# Patient Record
Sex: FEMALE | Race: WHITE | NOT HISPANIC OR LATINO | Employment: OTHER | URBAN - METROPOLITAN AREA
[De-identification: names, ages, dates, MRNs, and addresses within clinical notes are randomized per-mention and may not be internally consistent; named-entity substitution may affect disease eponyms.]

---

## 2017-08-23 ENCOUNTER — RECORDS - HEALTHEAST (OUTPATIENT)
Dept: LAB | Facility: CLINIC | Age: 77
End: 2017-08-23

## 2017-08-23 LAB
CHOLEST SERPL-MCNC: 148 MG/DL
FASTING STATUS PATIENT QL REPORTED: NO
HDLC SERPL-MCNC: 48 MG/DL
LDLC SERPL CALC-MCNC: 86 MG/DL
TRIGL SERPL-MCNC: 72 MG/DL

## 2018-09-28 ENCOUNTER — RECORDS - HEALTHEAST (OUTPATIENT)
Dept: LAB | Facility: CLINIC | Age: 78
End: 2018-09-28

## 2018-09-28 LAB
ANION GAP SERPL CALCULATED.3IONS-SCNC: 9 MMOL/L (ref 5–18)
BUN SERPL-MCNC: 13 MG/DL (ref 8–28)
CALCIUM SERPL-MCNC: 9.5 MG/DL (ref 8.5–10.5)
CHLORIDE BLD-SCNC: 106 MMOL/L (ref 98–107)
CHOLEST SERPL-MCNC: 210 MG/DL
CO2 SERPL-SCNC: 24 MMOL/L (ref 22–31)
CREAT SERPL-MCNC: 0.77 MG/DL (ref 0.6–1.1)
FASTING STATUS PATIENT QL REPORTED: ABNORMAL
GFR SERPL CREATININE-BSD FRML MDRD: >60 ML/MIN/1.73M2
GLUCOSE BLD-MCNC: 72 MG/DL (ref 70–125)
HDLC SERPL-MCNC: 52 MG/DL
LDLC SERPL CALC-MCNC: 142 MG/DL
POTASSIUM BLD-SCNC: 4.2 MMOL/L (ref 3.5–5)
SODIUM SERPL-SCNC: 139 MMOL/L (ref 136–145)
TRIGL SERPL-MCNC: 81 MG/DL

## 2018-10-29 ENCOUNTER — RECORDS - HEALTHEAST (OUTPATIENT)
Dept: LAB | Facility: CLINIC | Age: 78
End: 2018-10-29

## 2018-10-30 LAB — BACTERIA SPEC CULT: NO GROWTH

## 2019-01-16 ENCOUNTER — RECORDS - HEALTHEAST (OUTPATIENT)
Dept: LAB | Facility: CLINIC | Age: 79
End: 2019-01-16

## 2019-01-16 LAB
CK SERPL-CCNC: 214 U/L (ref 30–190)
ERYTHROCYTE [SEDIMENTATION RATE] IN BLOOD BY WESTERGREN METHOD: 2 MM/HR (ref 0–20)
TSH SERPL DL<=0.005 MIU/L-ACNC: 0.97 UIU/ML (ref 0.3–5)

## 2019-04-11 ENCOUNTER — RECORDS - HEALTHEAST (OUTPATIENT)
Dept: LAB | Facility: CLINIC | Age: 79
End: 2019-04-11

## 2019-04-11 LAB
CHOLEST SERPL-MCNC: 217 MG/DL
FASTING STATUS PATIENT QL REPORTED: ABNORMAL
HDLC SERPL-MCNC: 52 MG/DL
LDLC SERPL CALC-MCNC: 146 MG/DL
TRIGL SERPL-MCNC: 97 MG/DL

## 2020-02-06 ENCOUNTER — RECORDS - HEALTHEAST (OUTPATIENT)
Dept: LAB | Facility: CLINIC | Age: 80
End: 2020-02-06

## 2020-02-06 LAB
ALBUMIN SERPL-MCNC: 3.8 G/DL (ref 3.5–5)
ALP SERPL-CCNC: 73 U/L (ref 45–120)
ALT SERPL W P-5'-P-CCNC: 10 U/L (ref 0–45)
ANION GAP SERPL CALCULATED.3IONS-SCNC: 8 MMOL/L (ref 5–18)
AST SERPL W P-5'-P-CCNC: 16 U/L (ref 0–40)
BILIRUB SERPL-MCNC: 0.8 MG/DL (ref 0–1)
BUN SERPL-MCNC: 17 MG/DL (ref 8–28)
CALCIUM SERPL-MCNC: 9.5 MG/DL (ref 8.5–10.5)
CHLORIDE BLD-SCNC: 101 MMOL/L (ref 98–107)
CHOLEST SERPL-MCNC: 211 MG/DL
CK SERPL-CCNC: 137 U/L (ref 30–190)
CO2 SERPL-SCNC: 25 MMOL/L (ref 22–31)
CREAT SERPL-MCNC: 0.81 MG/DL (ref 0.6–1.1)
FASTING STATUS PATIENT QL REPORTED: ABNORMAL
GFR SERPL CREATININE-BSD FRML MDRD: >60 ML/MIN/1.73M2
GLUCOSE BLD-MCNC: 82 MG/DL (ref 70–125)
HDLC SERPL-MCNC: 54 MG/DL
LDLC SERPL CALC-MCNC: 140 MG/DL
POTASSIUM BLD-SCNC: 4.8 MMOL/L (ref 3.5–5)
PROT SERPL-MCNC: 6.1 G/DL (ref 6–8)
SODIUM SERPL-SCNC: 134 MMOL/L (ref 136–145)
TRIGL SERPL-MCNC: 86 MG/DL

## 2021-03-17 ENCOUNTER — RECORDS - HEALTHEAST (OUTPATIENT)
Dept: LAB | Facility: CLINIC | Age: 81
End: 2021-03-17

## 2021-03-17 LAB
ALBUMIN SERPL-MCNC: 3.9 G/DL (ref 3.5–5)
ALP SERPL-CCNC: 71 U/L (ref 45–120)
ALT SERPL W P-5'-P-CCNC: 15 U/L (ref 0–45)
ANION GAP SERPL CALCULATED.3IONS-SCNC: 9 MMOL/L (ref 5–18)
AST SERPL W P-5'-P-CCNC: 19 U/L (ref 0–40)
BILIRUB SERPL-MCNC: 0.8 MG/DL (ref 0–1)
BUN SERPL-MCNC: 13 MG/DL (ref 8–28)
CALCIUM SERPL-MCNC: 8.9 MG/DL (ref 8.5–10.5)
CHLORIDE BLD-SCNC: 100 MMOL/L (ref 98–107)
CHOLEST SERPL-MCNC: 205 MG/DL
CO2 SERPL-SCNC: 25 MMOL/L (ref 22–31)
CREAT SERPL-MCNC: 0.78 MG/DL (ref 0.6–1.1)
FASTING STATUS PATIENT QL REPORTED: ABNORMAL
GFR SERPL CREATININE-BSD FRML MDRD: >60 ML/MIN/1.73M2
GLUCOSE BLD-MCNC: 112 MG/DL (ref 70–125)
HDLC SERPL-MCNC: 53 MG/DL
LDLC SERPL CALC-MCNC: 134 MG/DL
POTASSIUM BLD-SCNC: 4.1 MMOL/L (ref 3.5–5)
PROT SERPL-MCNC: 6.1 G/DL (ref 6–8)
SODIUM SERPL-SCNC: 134 MMOL/L (ref 136–145)
TRIGL SERPL-MCNC: 91 MG/DL

## 2021-05-14 ENCOUNTER — RECORDS - HEALTHEAST (OUTPATIENT)
Dept: ADMINISTRATIVE | Facility: OTHER | Age: 81
End: 2021-05-14

## 2021-05-14 ENCOUNTER — ANESTHESIA - HEALTHEAST (OUTPATIENT)
Dept: SURGERY | Facility: HOSPITAL | Age: 81
End: 2021-05-14

## 2021-05-14 ASSESSMENT — MIFFLIN-ST. JEOR: SCORE: 1285.96

## 2021-05-15 ENCOUNTER — COMMUNICATION - HEALTHEAST (OUTPATIENT)
Dept: SCHEDULING | Facility: CLINIC | Age: 81
End: 2021-05-15

## 2021-05-15 ENCOUNTER — SURGERY - HEALTHEAST (OUTPATIENT)
Dept: SURGERY | Facility: HOSPITAL | Age: 81
End: 2021-05-15

## 2021-05-25 ENCOUNTER — RECORDS - HEALTHEAST (OUTPATIENT)
Dept: ADMINISTRATIVE | Facility: CLINIC | Age: 81
End: 2021-05-25

## 2021-05-27 ENCOUNTER — RECORDS - HEALTHEAST (OUTPATIENT)
Dept: ADMINISTRATIVE | Facility: CLINIC | Age: 81
End: 2021-05-27

## 2021-05-27 VITALS — HEIGHT: 69 IN | BODY MASS INDEX: 24.96 KG/M2 | WEIGHT: 169 LBS

## 2021-05-30 ENCOUNTER — RECORDS - HEALTHEAST (OUTPATIENT)
Dept: ADMINISTRATIVE | Facility: CLINIC | Age: 81
End: 2021-05-30

## 2021-06-01 ENCOUNTER — RECORDS - HEALTHEAST (OUTPATIENT)
Dept: ADMINISTRATIVE | Facility: OTHER | Age: 81
End: 2021-06-01

## 2021-06-02 ENCOUNTER — RECORDS - HEALTHEAST (OUTPATIENT)
Dept: ADMINISTRATIVE | Facility: CLINIC | Age: 81
End: 2021-06-02

## 2021-06-16 PROBLEM — E53.8 VITAMIN B12 DEFICIENCY (NON ANEMIC): Status: ACTIVE | Noted: 2021-05-14

## 2021-06-16 PROBLEM — S72.002A HIP FRACTURE, LEFT, CLOSED, INITIAL ENCOUNTER (H): Status: ACTIVE | Noted: 2021-05-14

## 2021-06-16 PROBLEM — I10 BENIGN ESSENTIAL HYPERTENSION: Status: ACTIVE | Noted: 2021-05-14

## 2021-06-16 PROBLEM — M54.30 SCIATICA: Status: ACTIVE | Noted: 2021-05-14

## 2021-06-16 PROBLEM — E78.2 MIXED HYPERLIPIDEMIA: Status: ACTIVE | Noted: 2021-05-14

## 2021-06-16 PROBLEM — S72.002A: Status: ACTIVE | Noted: 2021-05-14

## 2021-06-16 PROBLEM — E87.1 HYPONATREMIA: Status: ACTIVE | Noted: 2021-05-14

## 2021-06-17 NOTE — ANESTHESIA PREPROCEDURE EVALUATION
Anesthesia Evaluation      Patient summary reviewed     Airway   Mallampati: II   Pulmonary - normal exam                          Cardiovascular   (+) hypertension, , hypercholesterolemia,     Rhythm: regular        Neuro/Psych    (+) neuromuscular disease,      Endo/Other       Comments: Hip fracture, left, closed  Hyponatremia  B12 Deficiency    GI/Hepatic/Renal            Dental - normal exam                        Anesthesia Plan  Planned anesthetic: spinal and peripheral nerve block  FASCIA ILIACA FOR POSITIONING AND POST OP PAIN  ASA 2       Anesthesia plan special considerations: antiemetics,

## 2021-06-17 NOTE — ANESTHESIA PROCEDURE NOTES
Peripheral Block    Patient location during procedure: pre-op  Start time: 5/15/2021 9:39 AM  End time: 5/15/2021 9:43 AM  post-op analgesia per surgeon order as noted in medical record  Staffing:  Performing  Anesthesiologist: David German MD  Preanesthetic Checklist  Completed: patient identified, site marked, risks, benefits, and alternatives discussed, timeout performed, consent obtained, at patient's request, airway assessed, oxygen available, suction available, emergency drugs available and hand hygiene performed  Peripheral Block  Nerve block type: FASCIA ILIACA.  Prep: ChloraPrep  Patient position: supine  Patient monitoring: blood pressure, heart rate, continuous pulse oximetry and cardiac monitor  Laterality: left  Injection technique: ultrasound guided    Ultrasound used to visualize needle placement in proximity to nerve being blocked: yes   US used to visualize anesthetic spread  Visualized anatomic structures normal  No Pathological Findings  Permanent ultrasound image captured for medical record  Sterile gel and probe cover used for ultrasound.  Needle  Needle type: Stimuplex   Needle gauge: 21 G  Needle length: 4 in  no peripheral nerve catheter placed  Assessment  Injection assessment: no difficulty with injection, negative aspiration for heme, no paresthesia on injection and incremental injection

## 2021-06-17 NOTE — ANESTHESIA CARE TRANSFER NOTE
Last vitals:   Vitals:    05/15/21 1202   BP: 129/58   Pulse: 77   Resp: 17   Temp: 36.5  C (97.7  F)   SpO2: 97%     Patient's level of consciousness is drowsy  Spontaneous respirations: yes  Maintains airway independently: yes  Dentition unchanged: yes  Oropharynx: oropharynx clear of all foreign objects    QCDR Measures:  ASA# 20 - Surgical Safety Checklist: WHO surgical safety checklist completed prior to induction    PQRS# 430 - Adult PONV Prevention: 4558F - Pt received => 2 anti-emetic agents (different classes) preop & intraop  ASA# 8 - Peds PONV Prevention: NA - Not pediatric patient, not GA or 2 or more risk factors NOT present  PQRS# 424 - Richelle-op Temp Management: 4559F - At least one body temp DOCUMENTED => 35.5C or 95.9F within required timeframe  PQRS# 426 - PACU Transfer Protocol: - Transfer of care checklist used  ASA# 14 - Acute Post-op Pain: ASA14B - Patient did NOT experience pain >= 7 out of 10

## 2021-06-17 NOTE — ANESTHESIA POSTPROCEDURE EVALUATION
Patient: Alisson Gooden  Procedure(s):  HEMIARTHROPLASTY, HIP, BIPOLAR (Left)  Anesthesia type: spinal    Patient location: PACU  Last vitals:   Vitals Value Taken Time   /72 05/15/21 1315   Temp 36.4  C (97.5  F) 05/15/21 1245   Pulse 71 05/15/21 1328   Resp 15 05/15/21 1328   SpO2 100 % 05/15/21 1328   Vitals shown include unvalidated device data.  Post vital signs: stable  Level of consciousness: awake and responds to simple questions  Post-anesthesia pain: pain controlled  Post-anesthesia nausea and vomiting: no  Pulmonary: unassisted, return to baseline  Cardiovascular: stable and blood pressure at baseline  Hydration: adequate  Anesthetic events: no    QCDR Measures:  ASA# 11 - Richelle-op Cardiac Arrest: ASA11B - Patient did NOT experience unanticipated cardiac arrest  ASA# 12 - Richelle-op Mortality Rate: ASA12B - Patient did NOT die  ASA# 13 - PACU Re-Intubation Rate: NA - No ETT / LMA used for case  ASA# 10 - Composite Anes Safety: ASA10A - No serious adverse event    Additional Notes:

## 2021-06-17 NOTE — ANESTHESIA PROCEDURE NOTES
Spinal Block    Patient location during procedure: OR  Start time: 5/15/2021 10:23 AM  End time: 5/15/2021 10:26 AM  Reason for block: at surgeon's request, post-op pain management and primary anesthetic    Staffing:  Performing  Anesthesiologist: David German MD    Preanesthetic Checklist  Completed: patient identified, risks, benefits, and alternatives discussed, timeout performed, consent obtained, at patient's request, airway assessed, oxygen available, suction available, emergency drugs available and hand hygiene performed  Spinal Block  Patient position: sitting  Prep: ChloraPrep  Patient monitoring: heart rate, cardiac monitor, continuous pulse ox and blood pressure  Approach: midline  Location: L4-5  Injection technique: single-shot  Needle type: pencil-tip   Needle gauge: 24 G    Assessment  Sensory level: T8

## 2021-07-21 ENCOUNTER — RECORDS - HEALTHEAST (OUTPATIENT)
Dept: ADMINISTRATIVE | Facility: CLINIC | Age: 81
End: 2021-07-21

## 2021-08-15 ENCOUNTER — HEALTH MAINTENANCE LETTER (OUTPATIENT)
Age: 81
End: 2021-08-15

## 2021-10-11 ENCOUNTER — HEALTH MAINTENANCE LETTER (OUTPATIENT)
Age: 81
End: 2021-10-11

## 2022-01-12 VITALS — HEIGHT: 69 IN | WEIGHT: 169 LBS | BODY MASS INDEX: 25.03 KG/M2

## 2022-09-25 ENCOUNTER — HEALTH MAINTENANCE LETTER (OUTPATIENT)
Age: 82
End: 2022-09-25

## 2022-10-06 ENCOUNTER — TRANSFERRED RECORDS (OUTPATIENT)
Dept: HEALTH INFORMATION MANAGEMENT | Facility: CLINIC | Age: 82
End: 2022-10-06

## 2022-10-06 ENCOUNTER — LAB REQUISITION (OUTPATIENT)
Dept: LAB | Facility: CLINIC | Age: 82
End: 2022-10-06
Payer: MEDICARE

## 2022-10-06 DIAGNOSIS — I10 ESSENTIAL (PRIMARY) HYPERTENSION: ICD-10-CM

## 2022-10-06 DIAGNOSIS — E78.2 MIXED HYPERLIPIDEMIA: ICD-10-CM

## 2022-10-06 LAB
ALBUMIN SERPL BCG-MCNC: 4.2 G/DL (ref 3.5–5.2)
ALP SERPL-CCNC: 77 U/L (ref 35–104)
ALT SERPL W P-5'-P-CCNC: 11 U/L (ref 10–35)
ANION GAP SERPL CALCULATED.3IONS-SCNC: 9 MMOL/L (ref 7–15)
AST SERPL W P-5'-P-CCNC: 18 U/L (ref 10–35)
BILIRUB SERPL-MCNC: 0.5 MG/DL
BUN SERPL-MCNC: 19 MG/DL (ref 8–23)
CALCIUM SERPL-MCNC: 9.5 MG/DL (ref 8.8–10.2)
CHLORIDE SERPL-SCNC: 99 MMOL/L (ref 98–107)
CHOLEST SERPL-MCNC: 208 MG/DL
CREAT SERPL-MCNC: 0.86 MG/DL (ref 0.51–0.95)
DEPRECATED HCO3 PLAS-SCNC: 25 MMOL/L (ref 22–29)
GFR SERPL CREATININE-BSD FRML MDRD: 66 ML/MIN/1.73M2
GLUCOSE SERPL-MCNC: 102 MG/DL (ref 70–99)
HDLC SERPL-MCNC: 57 MG/DL
LDLC SERPL CALC-MCNC: 128 MG/DL
NONHDLC SERPL-MCNC: 151 MG/DL
POTASSIUM SERPL-SCNC: 5.1 MMOL/L (ref 3.4–5.3)
PROT SERPL-MCNC: 6 G/DL (ref 6.4–8.3)
SODIUM SERPL-SCNC: 133 MMOL/L (ref 136–145)
TRIGL SERPL-MCNC: 113 MG/DL

## 2022-10-06 PROCEDURE — 80061 LIPID PANEL: CPT | Mod: ORL | Performed by: FAMILY MEDICINE

## 2022-10-06 PROCEDURE — 80053 COMPREHEN METABOLIC PANEL: CPT | Mod: ORL | Performed by: FAMILY MEDICINE

## 2022-10-12 ENCOUNTER — TRANSFERRED RECORDS (OUTPATIENT)
Dept: HEALTH INFORMATION MANAGEMENT | Facility: CLINIC | Age: 82
End: 2022-10-12

## 2022-10-20 ENCOUNTER — MEDICAL CORRESPONDENCE (OUTPATIENT)
Dept: HEALTH INFORMATION MANAGEMENT | Facility: CLINIC | Age: 82
End: 2022-10-20

## 2022-10-24 ENCOUNTER — TRANSCRIBE ORDERS (OUTPATIENT)
Dept: OTHER | Age: 82
End: 2022-10-24

## 2022-10-24 DIAGNOSIS — M79.672 FOOT PAIN, LEFT: Primary | ICD-10-CM

## 2023-04-10 ENCOUNTER — LAB REQUISITION (OUTPATIENT)
Dept: LAB | Facility: CLINIC | Age: 83
End: 2023-04-10
Payer: MEDICARE

## 2023-04-10 DIAGNOSIS — R42 DIZZINESS AND GIDDINESS: ICD-10-CM

## 2023-04-10 LAB
ALBUMIN SERPL BCG-MCNC: 4 G/DL (ref 3.5–5.2)
ALP SERPL-CCNC: 90 U/L (ref 35–104)
ALT SERPL W P-5'-P-CCNC: 20 U/L (ref 10–35)
ANION GAP SERPL CALCULATED.3IONS-SCNC: 15 MMOL/L (ref 7–15)
AST SERPL W P-5'-P-CCNC: 29 U/L (ref 10–35)
BILIRUB SERPL-MCNC: 0.6 MG/DL
BUN SERPL-MCNC: 17 MG/DL (ref 8–23)
CALCIUM SERPL-MCNC: 9.2 MG/DL (ref 8.8–10.2)
CHLORIDE SERPL-SCNC: 90 MMOL/L (ref 98–107)
CREAT SERPL-MCNC: 0.97 MG/DL (ref 0.51–0.95)
DEPRECATED HCO3 PLAS-SCNC: 24 MMOL/L (ref 22–29)
GFR SERPL CREATININE-BSD FRML MDRD: 57 ML/MIN/1.73M2
GLUCOSE SERPL-MCNC: 98 MG/DL (ref 70–99)
POTASSIUM SERPL-SCNC: 4.2 MMOL/L (ref 3.4–5.3)
PROT SERPL-MCNC: 6 G/DL (ref 6.4–8.3)
SODIUM SERPL-SCNC: 129 MMOL/L (ref 136–145)

## 2023-04-10 PROCEDURE — 87088 URINE BACTERIA CULTURE: CPT | Mod: ORL | Performed by: PHYSICIAN ASSISTANT

## 2023-04-10 PROCEDURE — 80053 COMPREHEN METABOLIC PANEL: CPT | Mod: ORL | Performed by: PHYSICIAN ASSISTANT

## 2023-04-14 LAB
BACTERIA UR CULT: ABNORMAL
BACTERIA UR CULT: ABNORMAL

## 2023-04-27 ENCOUNTER — LAB REQUISITION (OUTPATIENT)
Dept: LAB | Facility: CLINIC | Age: 83
End: 2023-04-27
Payer: MEDICARE

## 2023-04-27 DIAGNOSIS — N39.0 URINARY TRACT INFECTION, SITE NOT SPECIFIED: ICD-10-CM

## 2023-04-27 LAB — FOLATE SERPL-MCNC: 11.8 NG/ML (ref 4.6–34.8)

## 2023-04-27 PROCEDURE — 84443 ASSAY THYROID STIM HORMONE: CPT | Mod: ORL | Performed by: PHYSICIAN ASSISTANT

## 2023-04-27 PROCEDURE — 87086 URINE CULTURE/COLONY COUNT: CPT | Mod: ORL | Performed by: PHYSICIAN ASSISTANT

## 2023-04-27 PROCEDURE — 82746 ASSAY OF FOLIC ACID SERUM: CPT | Mod: ORL | Performed by: PHYSICIAN ASSISTANT

## 2023-04-27 PROCEDURE — 82607 VITAMIN B-12: CPT | Mod: ORL | Performed by: PHYSICIAN ASSISTANT

## 2023-04-28 LAB
TSH SERPL DL<=0.005 MIU/L-ACNC: 1.1 UIU/ML (ref 0.3–4.2)
VIT B12 SERPL-MCNC: 472 PG/ML (ref 232–1245)

## 2023-04-29 LAB — BACTERIA UR CULT: NORMAL

## 2023-09-29 ENCOUNTER — LAB REQUISITION (OUTPATIENT)
Dept: LAB | Facility: CLINIC | Age: 83
End: 2023-09-29
Payer: MEDICARE

## 2023-09-29 DIAGNOSIS — R55 SYNCOPE AND COLLAPSE: ICD-10-CM

## 2023-09-29 DIAGNOSIS — R10.84 GENERALIZED ABDOMINAL PAIN: ICD-10-CM

## 2023-09-29 LAB
ALBUMIN SERPL BCG-MCNC: 4.1 G/DL (ref 3.5–5.2)
ALP SERPL-CCNC: 60 U/L (ref 35–104)
ALT SERPL W P-5'-P-CCNC: 10 U/L (ref 0–50)
ANION GAP SERPL CALCULATED.3IONS-SCNC: 11 MMOL/L (ref 7–15)
AST SERPL W P-5'-P-CCNC: 18 U/L (ref 0–45)
BILIRUB SERPL-MCNC: 0.8 MG/DL
BUN SERPL-MCNC: 11.9 MG/DL (ref 8–23)
CALCIUM SERPL-MCNC: 9.3 MG/DL (ref 8.8–10.2)
CHLORIDE SERPL-SCNC: 99 MMOL/L (ref 98–107)
CREAT SERPL-MCNC: 0.83 MG/DL (ref 0.51–0.95)
DEPRECATED HCO3 PLAS-SCNC: 24 MMOL/L (ref 22–29)
EGFRCR SERPLBLD CKD-EPI 2021: 69 ML/MIN/1.73M2
ERYTHROCYTE [SEDIMENTATION RATE] IN BLOOD BY WESTERGREN METHOD: 10 MM/HR (ref 0–30)
GLUCOSE SERPL-MCNC: 87 MG/DL (ref 70–99)
LIPASE SERPL-CCNC: 27 U/L (ref 13–60)
POTASSIUM SERPL-SCNC: 5 MMOL/L (ref 3.4–5.3)
PROT SERPL-MCNC: 6.2 G/DL (ref 6.4–8.3)
SODIUM SERPL-SCNC: 134 MMOL/L (ref 135–145)

## 2023-09-29 PROCEDURE — 85652 RBC SED RATE AUTOMATED: CPT | Mod: ORL | Performed by: FAMILY MEDICINE

## 2023-09-29 PROCEDURE — 83690 ASSAY OF LIPASE: CPT | Mod: ORL | Performed by: FAMILY MEDICINE

## 2023-09-29 PROCEDURE — 80053 COMPREHEN METABOLIC PANEL: CPT | Mod: ORL | Performed by: FAMILY MEDICINE

## 2023-09-29 PROCEDURE — 87086 URINE CULTURE/COLONY COUNT: CPT | Mod: ORL | Performed by: FAMILY MEDICINE

## 2023-10-01 LAB — BACTERIA UR CULT: NORMAL

## 2023-10-08 ENCOUNTER — APPOINTMENT (OUTPATIENT)
Dept: CT IMAGING | Facility: HOSPITAL | Age: 83
DRG: 312 | End: 2023-10-08
Attending: EMERGENCY MEDICINE
Payer: COMMERCIAL

## 2023-10-08 ENCOUNTER — HOSPITAL ENCOUNTER (INPATIENT)
Facility: HOSPITAL | Age: 83
LOS: 1 days | Discharge: HOME HEALTH | DRG: 312 | End: 2023-10-10
Attending: EMERGENCY MEDICINE | Admitting: INTERNAL MEDICINE
Payer: COMMERCIAL

## 2023-10-08 ENCOUNTER — TRANSFERRED RECORDS (OUTPATIENT)
Dept: HEALTH INFORMATION MANAGEMENT | Facility: CLINIC | Age: 83
End: 2023-10-08

## 2023-10-08 DIAGNOSIS — R55 SYNCOPE, UNSPECIFIED SYNCOPE TYPE: ICD-10-CM

## 2023-10-08 DIAGNOSIS — M25.561 PAIN AND SWELLING OF RIGHT KNEE: Primary | ICD-10-CM

## 2023-10-08 DIAGNOSIS — M25.461 PAIN AND SWELLING OF RIGHT KNEE: Primary | ICD-10-CM

## 2023-10-08 LAB
ALBUMIN SERPL BCG-MCNC: 3.8 G/DL (ref 3.5–5.2)
ALBUMIN UR-MCNC: 20 MG/DL
ALP SERPL-CCNC: 60 U/L (ref 35–104)
ALT SERPL W P-5'-P-CCNC: 9 U/L (ref 0–50)
AMORPH CRY #/AREA URNS HPF: ABNORMAL /HPF
ANION GAP SERPL CALCULATED.3IONS-SCNC: 10 MMOL/L (ref 7–15)
APPEARANCE UR: ABNORMAL
AST SERPL W P-5'-P-CCNC: 17 U/L (ref 0–45)
BASO+EOS+MONOS # BLD AUTO: NORMAL 10*3/UL
BASO+EOS+MONOS NFR BLD AUTO: NORMAL %
BASOPHILS # BLD AUTO: 0 10E3/UL (ref 0–0.2)
BASOPHILS NFR BLD AUTO: 0 %
BILIRUB DIRECT SERPL-MCNC: 0.22 MG/DL (ref 0–0.3)
BILIRUB SERPL-MCNC: 0.9 MG/DL
BILIRUB UR QL STRIP: NEGATIVE
BUN SERPL-MCNC: 13.5 MG/DL (ref 8–23)
CALCIUM SERPL-MCNC: 9.6 MG/DL (ref 8.8–10.2)
CHLORIDE SERPL-SCNC: 97 MMOL/L (ref 98–107)
COLOR UR AUTO: YELLOW
CREAT SERPL-MCNC: 0.75 MG/DL (ref 0.51–0.95)
DEPRECATED HCO3 PLAS-SCNC: 24 MMOL/L (ref 22–29)
EGFRCR SERPLBLD CKD-EPI 2021: 78 ML/MIN/1.73M2
EOSINOPHIL # BLD AUTO: 0 10E3/UL (ref 0–0.7)
EOSINOPHIL NFR BLD AUTO: 0 %
ERYTHROCYTE [DISTWIDTH] IN BLOOD BY AUTOMATED COUNT: 13.9 % (ref 10–15)
GLUCOSE SERPL-MCNC: 147 MG/DL (ref 70–99)
GLUCOSE UR STRIP-MCNC: NEGATIVE MG/DL
HCT VFR BLD AUTO: 39.9 % (ref 35–47)
HGB BLD-MCNC: 13.6 G/DL (ref 11.7–15.7)
HGB UR QL STRIP: NEGATIVE
HOLD SPECIMEN: NORMAL
HYALINE CASTS: 8 /LPF
IMM GRANULOCYTES # BLD: 0 10E3/UL
IMM GRANULOCYTES NFR BLD: 0 %
KETONES UR STRIP-MCNC: 20 MG/DL
LEUKOCYTE ESTERASE UR QL STRIP: NEGATIVE
LYMPHOCYTES # BLD AUTO: 2 10E3/UL (ref 0.8–5.3)
LYMPHOCYTES NFR BLD AUTO: 26 %
MCH RBC QN AUTO: 30.4 PG (ref 26.5–33)
MCHC RBC AUTO-ENTMCNC: 34.1 G/DL (ref 31.5–36.5)
MCV RBC AUTO: 89 FL (ref 78–100)
MONOCYTES # BLD AUTO: 0.4 10E3/UL (ref 0–1.3)
MONOCYTES NFR BLD AUTO: 6 %
MUCOUS THREADS #/AREA URNS LPF: PRESENT /LPF
NEUTROPHILS # BLD AUTO: 5.2 10E3/UL (ref 1.6–8.3)
NEUTROPHILS NFR BLD AUTO: 68 %
NITRATE UR QL: NEGATIVE
NRBC # BLD AUTO: 0 10E3/UL
NRBC BLD AUTO-RTO: 0 /100
PH UR STRIP: 7.5 [PH] (ref 5–7)
PLATELET # BLD AUTO: 327 10E3/UL (ref 150–450)
POTASSIUM SERPL-SCNC: 4.1 MMOL/L (ref 3.4–5.3)
PROT SERPL-MCNC: 5.8 G/DL (ref 6.4–8.3)
RBC # BLD AUTO: 4.48 10E6/UL (ref 3.8–5.2)
RBC URINE: 0 /HPF
SODIUM SERPL-SCNC: 131 MMOL/L (ref 135–145)
SP GR UR STRIP: 1.02 (ref 1–1.03)
TROPONIN T SERPL HS-MCNC: 17 NG/L
TROPONIN T SERPL HS-MCNC: 20 NG/L
UROBILINOGEN UR STRIP-MCNC: 6 MG/DL
WBC # BLD AUTO: 7.8 10E3/UL (ref 4–11)
WBC URINE: 2 /HPF

## 2023-10-08 PROCEDURE — 250N000011 HC RX IP 250 OP 636: Performed by: EMERGENCY MEDICINE

## 2023-10-08 PROCEDURE — 72125 CT NECK SPINE W/O DYE: CPT | Mod: MA

## 2023-10-08 PROCEDURE — 96374 THER/PROPH/DIAG INJ IV PUSH: CPT | Mod: XU

## 2023-10-08 PROCEDURE — 85025 COMPLETE CBC W/AUTO DIFF WBC: CPT | Performed by: EMERGENCY MEDICINE

## 2023-10-08 PROCEDURE — 99285 EMERGENCY DEPT VISIT HI MDM: CPT | Mod: 25

## 2023-10-08 PROCEDURE — 36415 COLL VENOUS BLD VENIPUNCTURE: CPT | Performed by: EMERGENCY MEDICINE

## 2023-10-08 PROCEDURE — 82248 BILIRUBIN DIRECT: CPT | Performed by: EMERGENCY MEDICINE

## 2023-10-08 PROCEDURE — 82607 VITAMIN B-12: CPT | Performed by: INTERNAL MEDICINE

## 2023-10-08 PROCEDURE — 84484 ASSAY OF TROPONIN QUANT: CPT | Performed by: EMERGENCY MEDICINE

## 2023-10-08 PROCEDURE — 36415 COLL VENOUS BLD VENIPUNCTURE: CPT | Performed by: STUDENT IN AN ORGANIZED HEALTH CARE EDUCATION/TRAINING PROGRAM

## 2023-10-08 PROCEDURE — 74174 CTA ABD&PLVS W/CONTRAST: CPT | Mod: MA

## 2023-10-08 PROCEDURE — 83880 ASSAY OF NATRIURETIC PEPTIDE: CPT | Performed by: INTERNAL MEDICINE

## 2023-10-08 PROCEDURE — 84480 ASSAY TRIIODOTHYRONINE (T3): CPT | Performed by: INTERNAL MEDICINE

## 2023-10-08 PROCEDURE — 84443 ASSAY THYROID STIM HORMONE: CPT | Performed by: INTERNAL MEDICINE

## 2023-10-08 PROCEDURE — 84145 PROCALCITONIN (PCT): CPT | Performed by: INTERNAL MEDICINE

## 2023-10-08 PROCEDURE — 84550 ASSAY OF BLOOD/URIC ACID: CPT | Performed by: INTERNAL MEDICINE

## 2023-10-08 PROCEDURE — 80048 BASIC METABOLIC PNL TOTAL CA: CPT | Performed by: EMERGENCY MEDICINE

## 2023-10-08 PROCEDURE — 85379 FIBRIN DEGRADATION QUANT: CPT | Performed by: INTERNAL MEDICINE

## 2023-10-08 PROCEDURE — 86140 C-REACTIVE PROTEIN: CPT | Performed by: INTERNAL MEDICINE

## 2023-10-08 PROCEDURE — 82728 ASSAY OF FERRITIN: CPT | Performed by: INTERNAL MEDICINE

## 2023-10-08 PROCEDURE — 70496 CT ANGIOGRAPHY HEAD: CPT | Mod: MA

## 2023-10-08 PROCEDURE — 81001 URINALYSIS AUTO W/SCOPE: CPT | Performed by: EMERGENCY MEDICINE

## 2023-10-08 RX ORDER — IOPAMIDOL 755 MG/ML
100 INJECTION, SOLUTION INTRAVASCULAR ONCE
Status: COMPLETED | OUTPATIENT
Start: 2023-10-08 | End: 2023-10-08

## 2023-10-08 RX ORDER — KETOROLAC TROMETHAMINE 15 MG/ML
15 INJECTION, SOLUTION INTRAMUSCULAR; INTRAVENOUS ONCE
Status: COMPLETED | OUTPATIENT
Start: 2023-10-09 | End: 2023-10-08

## 2023-10-08 RX ADMIN — IOPAMIDOL 100 ML: 755 INJECTION, SOLUTION INTRAVENOUS at 23:26

## 2023-10-08 RX ADMIN — KETOROLAC TROMETHAMINE 15 MG: 15 INJECTION INTRAMUSCULAR; INTRAVENOUS at 23:34

## 2023-10-08 ASSESSMENT — ACTIVITIES OF DAILY LIVING (ADL): ADLS_ACUITY_SCORE: 35

## 2023-10-09 ENCOUNTER — APPOINTMENT (OUTPATIENT)
Dept: MRI IMAGING | Facility: HOSPITAL | Age: 83
DRG: 312 | End: 2023-10-09
Attending: INTERNAL MEDICINE
Payer: COMMERCIAL

## 2023-10-09 ENCOUNTER — APPOINTMENT (OUTPATIENT)
Dept: PHYSICAL THERAPY | Facility: HOSPITAL | Age: 83
DRG: 312 | End: 2023-10-09
Attending: INTERNAL MEDICINE
Payer: COMMERCIAL

## 2023-10-09 ENCOUNTER — APPOINTMENT (OUTPATIENT)
Dept: RADIOLOGY | Facility: HOSPITAL | Age: 83
DRG: 312 | End: 2023-10-09
Attending: EMERGENCY MEDICINE
Payer: COMMERCIAL

## 2023-10-09 ENCOUNTER — APPOINTMENT (OUTPATIENT)
Dept: CARDIOLOGY | Facility: HOSPITAL | Age: 83
DRG: 312 | End: 2023-10-09
Attending: INTERNAL MEDICINE
Payer: COMMERCIAL

## 2023-10-09 PROBLEM — R55 SYNCOPE, UNSPECIFIED SYNCOPE TYPE: Status: ACTIVE | Noted: 2023-10-09

## 2023-10-09 LAB
ALBUMIN SERPL BCG-MCNC: 3.4 G/DL (ref 3.5–5.2)
ALP SERPL-CCNC: 54 U/L (ref 35–104)
ALT SERPL W P-5'-P-CCNC: 9 U/L (ref 0–50)
ANION GAP SERPL CALCULATED.3IONS-SCNC: 10 MMOL/L (ref 7–15)
AST SERPL W P-5'-P-CCNC: 16 U/L (ref 0–45)
BASO+EOS+MONOS # BLD AUTO: NORMAL 10*3/UL
BASO+EOS+MONOS NFR BLD AUTO: NORMAL %
BASOPHILS # BLD AUTO: 0 10E3/UL (ref 0–0.2)
BASOPHILS NFR BLD AUTO: 0 %
BILIRUB SERPL-MCNC: 0.8 MG/DL
BUN SERPL-MCNC: 11.5 MG/DL (ref 8–23)
CALCIUM SERPL-MCNC: 8.6 MG/DL (ref 8.8–10.2)
CHLORIDE SERPL-SCNC: 101 MMOL/L (ref 98–107)
CREAT SERPL-MCNC: 0.62 MG/DL (ref 0.51–0.95)
CRP SERPL-MCNC: <3 MG/L
D DIMER PPP FEU-MCNC: 0.8 UG/ML FEU (ref 0–0.5)
D DIMER PPP FEU-MCNC: 1.11 UG/ML FEU (ref 0–0.5)
DEPRECATED HCO3 PLAS-SCNC: 21 MMOL/L (ref 22–29)
EGFRCR SERPLBLD CKD-EPI 2021: 87 ML/MIN/1.73M2
EOSINOPHIL # BLD AUTO: 0 10E3/UL (ref 0–0.7)
EOSINOPHIL NFR BLD AUTO: 0 %
ERYTHROCYTE [DISTWIDTH] IN BLOOD BY AUTOMATED COUNT: 13.8 % (ref 10–15)
ERYTHROCYTE [SEDIMENTATION RATE] IN BLOOD BY WESTERGREN METHOD: 5 MM/HR (ref 0–30)
FERRITIN SERPL-MCNC: 56 NG/ML (ref 11–328)
FOLATE SERPL-MCNC: 13 NG/ML (ref 4.6–34.8)
GLUCOSE BLDC GLUCOMTR-MCNC: 118 MG/DL (ref 70–99)
GLUCOSE SERPL-MCNC: 109 MG/DL (ref 70–99)
HCT VFR BLD AUTO: 37.1 % (ref 35–47)
HGB BLD-MCNC: 12.4 G/DL (ref 11.7–15.7)
IMM GRANULOCYTES # BLD: 0 10E3/UL
IMM GRANULOCYTES NFR BLD: 0 %
LVEF ECHO: NORMAL
LYMPHOCYTES # BLD AUTO: 1.6 10E3/UL (ref 0.8–5.3)
LYMPHOCYTES NFR BLD AUTO: 26 %
MCH RBC QN AUTO: 29.9 PG (ref 26.5–33)
MCHC RBC AUTO-ENTMCNC: 33.4 G/DL (ref 31.5–36.5)
MCV RBC AUTO: 89 FL (ref 78–100)
MONOCYTES # BLD AUTO: 0.4 10E3/UL (ref 0–1.3)
MONOCYTES NFR BLD AUTO: 7 %
NEUTROPHILS # BLD AUTO: 4.2 10E3/UL (ref 1.6–8.3)
NEUTROPHILS NFR BLD AUTO: 67 %
NRBC # BLD AUTO: 0 10E3/UL
NRBC BLD AUTO-RTO: 0 /100
NT-PROBNP SERPL-MCNC: 86 PG/ML (ref 0–1800)
PLATELET # BLD AUTO: 283 10E3/UL (ref 150–450)
POTASSIUM SERPL-SCNC: 3.9 MMOL/L (ref 3.4–5.3)
PROCALCITONIN SERPL IA-MCNC: <0.02 NG/ML
PROT SERPL-MCNC: 5.2 G/DL (ref 6.4–8.3)
RBC # BLD AUTO: 4.15 10E6/UL (ref 3.8–5.2)
SODIUM SERPL-SCNC: 132 MMOL/L (ref 135–145)
T3 SERPL-MCNC: 108 NG/DL (ref 85–202)
TROPONIN T SERPL HS-MCNC: 18 NG/L
TROPONIN T SERPL HS-MCNC: 18 NG/L
TSH SERPL DL<=0.005 MIU/L-ACNC: 1.49 UIU/ML (ref 0.3–4.2)
URATE SERPL-MCNC: 3.4 MG/DL (ref 2.4–5.7)
URATE SERPL-MCNC: 3.7 MG/DL (ref 2.4–5.7)
VIT B12 SERPL-MCNC: 187 PG/ML (ref 232–1245)
WBC # BLD AUTO: 6.3 10E3/UL (ref 4–11)

## 2023-10-09 PROCEDURE — 255N000002 HC RX 255 OP 636: Performed by: INTERNAL MEDICINE

## 2023-10-09 PROCEDURE — 258N000003 HC RX IP 258 OP 636: Performed by: INTERNAL MEDICINE

## 2023-10-09 PROCEDURE — 250N000011 HC RX IP 250 OP 636: Performed by: INTERNAL MEDICINE

## 2023-10-09 PROCEDURE — 85652 RBC SED RATE AUTOMATED: CPT | Performed by: INTERNAL MEDICINE

## 2023-10-09 PROCEDURE — 85379 FIBRIN DEGRADATION QUANT: CPT | Performed by: INTERNAL MEDICINE

## 2023-10-09 PROCEDURE — 97530 THERAPEUTIC ACTIVITIES: CPT | Mod: GP

## 2023-10-09 PROCEDURE — 80053 COMPREHEN METABOLIC PANEL: CPT | Performed by: INTERNAL MEDICINE

## 2023-10-09 PROCEDURE — 70551 MRI BRAIN STEM W/O DYE: CPT | Mod: MA

## 2023-10-09 PROCEDURE — C9113 INJ PANTOPRAZOLE SODIUM, VIA: HCPCS | Performed by: INTERNAL MEDICINE

## 2023-10-09 PROCEDURE — 250N000013 HC RX MED GY IP 250 OP 250 PS 637: Performed by: STUDENT IN AN ORGANIZED HEALTH CARE EDUCATION/TRAINING PROGRAM

## 2023-10-09 PROCEDURE — 84550 ASSAY OF BLOOD/URIC ACID: CPT | Performed by: INTERNAL MEDICINE

## 2023-10-09 PROCEDURE — 93306 TTE W/DOPPLER COMPLETE: CPT | Mod: 26 | Performed by: INTERNAL MEDICINE

## 2023-10-09 PROCEDURE — 85025 COMPLETE CBC W/AUTO DIFF WBC: CPT | Performed by: INTERNAL MEDICINE

## 2023-10-09 PROCEDURE — 250N000011 HC RX IP 250 OP 636: Mod: JZ | Performed by: STUDENT IN AN ORGANIZED HEALTH CARE EDUCATION/TRAINING PROGRAM

## 2023-10-09 PROCEDURE — 84484 ASSAY OF TROPONIN QUANT: CPT | Performed by: INTERNAL MEDICINE

## 2023-10-09 PROCEDURE — 73560 X-RAY EXAM OF KNEE 1 OR 2: CPT | Mod: RT

## 2023-10-09 PROCEDURE — 99222 1ST HOSP IP/OBS MODERATE 55: CPT | Performed by: STUDENT IN AN ORGANIZED HEALTH CARE EDUCATION/TRAINING PROGRAM

## 2023-10-09 PROCEDURE — 36415 COLL VENOUS BLD VENIPUNCTURE: CPT | Performed by: INTERNAL MEDICINE

## 2023-10-09 PROCEDURE — G0378 HOSPITAL OBSERVATION PER HR: HCPCS

## 2023-10-09 PROCEDURE — 120N000001 HC R&B MED SURG/OB

## 2023-10-09 PROCEDURE — 97161 PT EVAL LOW COMPLEX 20 MIN: CPT | Mod: GP

## 2023-10-09 PROCEDURE — 82962 GLUCOSE BLOOD TEST: CPT

## 2023-10-09 PROCEDURE — 82746 ASSAY OF FOLIC ACID SERUM: CPT | Performed by: INTERNAL MEDICINE

## 2023-10-09 PROCEDURE — C8929 TTE W OR WO FOL WCON,DOPPLER: HCPCS

## 2023-10-09 RX ORDER — AMOXICILLIN 250 MG
1 CAPSULE ORAL 2 TIMES DAILY
Status: DISCONTINUED | OUTPATIENT
Start: 2023-10-09 | End: 2023-10-10 | Stop reason: HOSPADM

## 2023-10-09 RX ORDER — LISINOPRIL 20 MG/1
40 TABLET ORAL DAILY
Status: DISCONTINUED | OUTPATIENT
Start: 2023-10-10 | End: 2023-10-10 | Stop reason: HOSPADM

## 2023-10-09 RX ORDER — SODIUM CHLORIDE 9 MG/ML
INJECTION, SOLUTION INTRAVENOUS CONTINUOUS
Status: DISCONTINUED | OUTPATIENT
Start: 2023-10-09 | End: 2023-10-09

## 2023-10-09 RX ORDER — ONDANSETRON 2 MG/ML
4 INJECTION INTRAMUSCULAR; INTRAVENOUS EVERY 6 HOURS PRN
Status: DISCONTINUED | OUTPATIENT
Start: 2023-10-09 | End: 2023-10-10 | Stop reason: HOSPADM

## 2023-10-09 RX ORDER — PANTOPRAZOLE SODIUM 40 MG/1
40 TABLET, DELAYED RELEASE ORAL
Status: DISCONTINUED | OUTPATIENT
Start: 2023-10-10 | End: 2023-10-10 | Stop reason: HOSPADM

## 2023-10-09 RX ORDER — LISINOPRIL 40 MG/1
40 TABLET ORAL DAILY
COMMUNITY
Start: 2023-08-26

## 2023-10-09 RX ORDER — ENOXAPARIN SODIUM 100 MG/ML
40 INJECTION SUBCUTANEOUS EVERY 24 HOURS
Status: DISCONTINUED | OUTPATIENT
Start: 2023-10-09 | End: 2023-10-10 | Stop reason: HOSPADM

## 2023-10-09 RX ORDER — ONDANSETRON 4 MG/1
4 TABLET, ORALLY DISINTEGRATING ORAL EVERY 6 HOURS PRN
Status: DISCONTINUED | OUTPATIENT
Start: 2023-10-09 | End: 2023-10-10 | Stop reason: HOSPADM

## 2023-10-09 RX ORDER — HYDROMORPHONE HYDROCHLORIDE 1 MG/ML
0.3 INJECTION, SOLUTION INTRAMUSCULAR; INTRAVENOUS; SUBCUTANEOUS
Status: DISCONTINUED | OUTPATIENT
Start: 2023-10-09 | End: 2023-10-09

## 2023-10-09 RX ORDER — POLYETHYLENE GLYCOL 3350 17 G/17G
17 POWDER, FOR SOLUTION ORAL DAILY
Status: DISCONTINUED | OUTPATIENT
Start: 2023-10-09 | End: 2023-10-10 | Stop reason: HOSPADM

## 2023-10-09 RX ADMIN — SODIUM CHLORIDE: 9 INJECTION, SOLUTION INTRAVENOUS at 15:45

## 2023-10-09 RX ADMIN — SENNOSIDES AND DOCUSATE SODIUM 1 TABLET: 50; 8.6 TABLET ORAL at 20:45

## 2023-10-09 RX ADMIN — PANTOPRAZOLE SODIUM 40 MG: 40 INJECTION, POWDER, FOR SOLUTION INTRAVENOUS at 07:39

## 2023-10-09 RX ADMIN — POLYETHYLENE GLYCOL 3350 17 G: 17 POWDER, FOR SOLUTION ORAL at 16:47

## 2023-10-09 RX ADMIN — PERFLUTREN 2 ML: 6.52 INJECTION, SUSPENSION INTRAVENOUS at 10:31

## 2023-10-09 RX ADMIN — HYDROMORPHONE HYDROCHLORIDE 0.3 MG: 1 INJECTION, SOLUTION INTRAMUSCULAR; INTRAVENOUS; SUBCUTANEOUS at 04:21

## 2023-10-09 RX ADMIN — ENOXAPARIN SODIUM 40 MG: 40 INJECTION SUBCUTANEOUS at 20:45

## 2023-10-09 RX ADMIN — SODIUM CHLORIDE: 9 INJECTION, SOLUTION INTRAVENOUS at 05:38

## 2023-10-09 RX ADMIN — ONDANSETRON 4 MG: 2 INJECTION INTRAMUSCULAR; INTRAVENOUS at 04:20

## 2023-10-09 ASSESSMENT — ACTIVITIES OF DAILY LIVING (ADL)
ADLS_ACUITY_SCORE: 35
ADLS_ACUITY_SCORE: 35
DEPENDENT_IADLS:: INDEPENDENT
ADLS_ACUITY_SCORE: 35

## 2023-10-09 NOTE — ED NOTES
Pt straight cathed for urine sample. C/O right knee pain, knee appears swollen and painful to move. She states this is abnormal for her. CMS intact.

## 2023-10-09 NOTE — H&P
[facility]    History and Physical - Hospitalist Service       Date of Admission:  10/8/2023    Assessment & Plan      Patient is an 83-year-old female with a medical history significant for prior history of traumatic subdural hematoma status craniotomy with clot evacuation, hypertension, chronic pain syndrome, prior history of seizure disorder and other medical comorbidities who is being admitted for further evaluation of intermittent change in mental status for several hours over the past weekend.          Patient Active Problem List   Diagnosis    Traumatic closed nondisplaced fracture of neck of femur, left, initial encounter (H)    Hip fracture, left, closed, initial encounter (H)    Benign essential hypertension    Mixed hyperlipidemia    Sciatica    Vitamin B12 deficiency (non anemic)    Hyponatremia    Syncope      Recurrent syncope-etiology unknown.  Work-up ongoing.  Patient admitted to cardiac telemetry for closer monitoring.  Neurochecks ongoing.  Pulse oximetry, fall precautions.  PT OT evaluation.  Rule out ACS, evaluate for seizures.  Neurochecks, check electrolyte abnormalities and replete.  Check D-dimer. DD-Cardiac syncope/vasovagal/seizures. I am also worried about possible postural hypotension.  I will check orthostasis for now.  Obtain an MRI of the brain due to prior history of subdural hemorrhage.  Neurochecks, fall precautions, aspiration precautions.    Unable to review CT head, neck chest abdomen and pelvis.  A.m. team to follow    Hypertension-pain control for now.  We will monitor. Consider hydralazine as needed after intracranial pathology ruled out completely    Hyperlipidemia-outpatient follow-up    Right knee pain-x-ray knee without any acute injury.  Check uric acic    Prior history of subdural hematoma-neurochecks, seizure precautions, fall precautions    History of seizure disorder-plan as above, telemetry  Rest of patient's medical problems management  "remains unchanged     Diet:  Full code  DVT Prophylaxis: Heparin SQ  Berry Catheter: Not present  Lines: None     Cardiac Monitoring: None  Code Status:  Full code    Clinically Significant Risk Factors Present on Admission                  # Hypertension: Noted on problem list                 Disposition Plan      Expected Discharge Date: 10/10/2023                  Dee Ross MD  Hospitalist Service  [facility]  Securely message with [employer] (more info)  Text page via [email or web address] Paging/Directory     ______________________________________________________________________    Chief Complaint   syncope    History is obtained from the patient    History of Present Illness   Patient is an 83-year-old female with a medical history significant for prior history of traumatic subdural hematoma status craniotomy with clot evacuation, hypertension, chronic pain syndrome, prior history of seizure disorder and other medical comorbidities who is being admitted for further evaluation of intermittent change in mental status for several hours over the past weekend.    Patient was seen in her room on admission.   She was awake, alert oriented to place person and time but provide a history.     Per report, patient presented to the ED due to recurrent Episodes of unresponsiveness which is described as \" passing out multiple times\" today.  Patient also complained of abdominal pain wrapping around her back.  She reportedly had an episode of 5 minutes unresponsiveness barely arousable.    History was obtained by talking to patient and her daughter.     They both report that in the past week or so, patient has had progressive increased fatigue.  The fatigue is worse when she is walking or up on her feet.  Patient admits that she does not drink enough fluids.  She denies any recent falls.  There is no reported history of fevers, chills, nausea, vomiting, diarrhea, abdominal pain or dysuria.    She also complained of  " "neck pain and right knee pain in the ER and extensive work-up was done. She also reported left-sided headache    Preliminary w/up done in the ER showed a trauma evaluation with a CT neck, CTA head and CT a chest abdomen and pelvis all of which were reported to be negative.  Patient also had an x-ray of the right knee due to ongoing complaints of right knee pain.       Preliminary labs done showed a sodium of 131 potassium 4.1 otherwise BMP unremarkable.  Blood sugar was 147 on presentation.  Initial troponin was 20 and 17 on repeat.  CBC was unremarkable with a hemoglobin of 13.6 platelet 327 and white count of 7.8.  UA was turbid but did not show pyuria    ER intervention included giving patient pain medication and admitted for further inpatient evaluation on cardiac telemetry for \" syncope\" patient was also given a dose of Toradol for pain control.  P Toradol given.       Patient's daughter described the episode as becoming unresponsive and staring into space with arms hanging down and head hanging down after she sat down she was unresponsive for about 5 minutes.  She came around a bit confused and also lost urine. She is a full code.  Her daughter thinks she was catatonic posturing during the episode    Patient is a full code      Past Medical History    Subdural hematoma-traumatic  Hypertension  Chronic pain syndrome    Past Surgical History   Past Surgical History:   Procedure Laterality Date    APPENDECTOMY      CHOLECYSTECTOMY      ECTOPIC PREGNANCY SURGERY      ZZC PARTIAL HIP REPLACEMENT Left 5/15/2021    Procedure: LEFT HEMIARTHROPLASTY, HIP, BIPOLAR;  Surgeon: Jermaine Greenfield MD;  [place] [facility] [place]  Service: [place]opedics       Prior to Admission Medications   Prior to Admission Medications   Prescriptions Last Dose Informant Patient Reported? Taking?   acetaminophen (TYLENOL) 325 MG tablet   No No   Sig: [ACETAMINOPHEN (TYLENOL) 325 MG TABLET] Take 2 tablets (650 mg total) by mouth every 4 " (four) hours as needed.   cholecalciferol, vitamin D3, 1,000 unit (25 mcg) tablet   Yes No   Sig: [CHOLECALCIFEROL, VITAMIN D3, 1,000 UNIT (25 MCG) TABLET] Take 1,000 Units by mouth daily.   cyanocobalamin 500 MCG tablet   Yes No   Sig: [CYANOCOBALAMIN 500 MCG TABLET] Take 500 mcg by mouth daily.   ibuprofen (ADVIL,MOTRIN) 200 MG tablet   Yes No   Sig: [IBUPROFEN (ADVIL,MOTRIN) 200 MG TABLET] Take 400 mg by mouth every 8 (eight) hours as needed for pain.    lisinopriL (PRINIVIL,ZESTRIL) 20 MG tablet   Yes No   Sig: [LISINOPRIL (PRINIVIL,ZESTRIL) 20 MG TABLET] Take 20 mg by mouth daily.   loratadine-pseudoephedrine (LORATADINE-PSEUDOEPHEDRINE) 5-120 mg Tb12   Yes No   Sig: [LORATADINE-PSEUDOEPHEDRINE (LORATADINE-PSEUDOEPHEDRINE) 5-120 MG TB12] Take 1 tablet by mouth daily.   oxyCODONE (ROXICODONE) 5 MG immediate release tablet   No No   Sig: [OXYCODONE (ROXICODONE) 5 MG IMMEDIATE RELEASE TABLET] Take 1 tablet (5 mg total) by mouth every 4 (four) hours as needed.   vitamin E 400 unit capsule   Yes No   Sig: [VITAMIN E 400 UNIT CAPSULE] Take 400 Units by mouth daily.      Facility-Administered Medications: None        Review of Systems    The 10 point Review of Systems is negative other than noted in the HPI or here.     Social History   I have reviewed this patient's social history and updated it with pertinent information if needed.  Social History     Tobacco Use    Smoking status: Never    Smokeless tobacco: Never   Substance Use Topics    Alcohol use: Not Currently         Family History   I have reviewed this patient's family history and updated it with pertinent information if needed.  Family History   Problem Relation Age of Onset    Coronary Artery Disease Mother     Gallbladder Disease Father     Coronary Artery Disease Brother          Allergies   No Known Allergies     Physical Exam   Vital Signs: Temp: 97.7  F (36.5  C) Temp src: Oral BP: (!) 192/92 Pulse: 89   Resp: 17 SpO2: 99 % O2 Device: None (Room  air)    Weight: 0 lbs 0 oz      General Aox3, appropriate affect, NAD, on RA  HEENT  MMM, EOMI, PERRL  Chest Adeq E b/l, No wheezing  Heart RRR, No M/R/G  Abd- Soft, NT, BS+  - Deferred,   Extremity- Moving all extremities, No digital clubbing,  +edema  Neuro- Aox3, CN II- XII intact, No peripheral vision loss  P5/5, T-N, reflexes 2+, plantar reflex downwards,  gait not checked  Skin  Has no tattoo, No skin rash     Medical Decision Making       Disposition-likely will need 1 to 2 days of hospitalization for pain control  Data     I have personally reviewed the following data over the past 24 hrs:    7.8  \   13.6   / 327     131 (L) 97 (L) 13.5 /  147 (H)   4.1 24 0.75 \     ALT: 9 AST: 17 AP: 60 TBILI: 0.9   ALB: 3.8 TOT PROTEIN: 5.8 (L) LIPASE: N/A     Trop: 17 (H) BNP: N/A       Imaging results reviewed over the past 24 hrs:   No results found for this or any previous visit (from the past 24 hour(s)).  Recent Labs   Lab 10/08/23  2139   WBC 7.8   HGB 13.6   MCV 89      *   POTASSIUM 4.1   CHLORIDE 97*   CO2 24   BUN 13.5   CR 0.75   ANIONGAP 10   NALDO 9.6   *   ALBUMIN 3.8   PROTTOTAL 5.8*   BILITOTAL 0.9   ALKPHOS 60   ALT 9   AST 17

## 2023-10-09 NOTE — PROGRESS NOTES
Called report to floor MERRITT Faria. Pt to be transferred to room 120 with all belongings. Pt understands plan.

## 2023-10-09 NOTE — PROGRESS NOTES
[facility]    Medicine Progress Note - Hospitalist Service    Date of Admission:  10/8/2023    Assessment & Plan   Patient is an 83-year-old female with a medical history significant for prior history of traumatic subdural hematoma status craniotomy with clot evacuation, hypertension, chronic pain syndrome who is being admitted for further evaluation of intermittent change in mental status for several hours over the past weekend.    #Acute episode of unresponsiveness  The patient walked from couch to kitchen table on 10/8 and felt fine. Then while seated she became unresponsive and slumped forward. No trauma. Patient's family called EMS and the episode reportedly lasted about 5 minutes. No recollection and next memory is being in the ambulance. Unclear if patient lost control of bladder but denied a post-ictal state. No rhythmic movements. No palpitations. No flushing/warmth or lightheadedness prior to spell. On admission lab work without significant abnormalities. Unclear etiology. Does not sound like a seizure. Possibly arrhythmogenic or orthostatic.  - Neurology consulted to eval if seizure possible and they believe this is unlikely, now signed off  - Telemetry while admitted  - S/p IVF  - PT recommending home with  PT    #Recurrent presyncope  The patient and daughter report several months of occasional episodes where the patient will feel a sense that she is going to pass out. Was happening a few times a month but has happened several times in the last 10 days. Patient is always standing for a prolonged period and the pre-syncope feeling starts suddenly. No lightheadedness, visual changes, headaches, palpitations, shaking, loss of awareness or bowel/bladder control. Remembers the events. They last a minute or so. Has never fainted or fallen. Unclear what is causing this but possibly delayed postural hypotension due to prolonged standing or arrhythmia.  - Telemetry here; will need  cardiac monitor at discharge  - TTE with poor windows but grossly normal biventricular function  - CT C/A/P and MRI brain here with chronic microvascular changes and chronic lacunar infarcts but no cause for pre-syncope or acute changes    #Acute abdominal pain  Developed acute abdominal pain 10/8 that has since resolved. Passing gas. CT on admission showed left hemidiaphragm elevation with distended stomach and loops of stool-filled large bowel near the distal stomach that may have led to gastric outlet obstruction. Seems to have self-resolved.  - Stool softeners    #NIMI  No symptoms of ACS. HS trop flat ~18.    #Hypertension  - Continue home lisinopril 40mg daily    #Right knee pain  Right knee swollen without erythema or warmth. No history of gout. Was normal the day before. Most likely due to a minor trauma en route to the hospital. Was present on admission.  - X-ray knee without any acute injury    #Prior history of subdural hematoma  #Severe stenosis of supraclinoid right ICA  CT head without acute process. Incidental finding of right ICA stenosis.       Diet: Regular Diet Adult    DVT Prophylaxis: Enoxaparin (Lovenox) SQ  Berry Catheter: Not present  Lines: None     Cardiac Monitoring: None  Code Status: Full Code      Clinically Significant Risk Factors Present on Admission              # Hypoalbuminemia: Lowest albumin = 3.4 g/dL at 10/9/2023  5:38 AM, will monitor as appropriate     # Hypertension: Noted on problem list                 Disposition Plan      Expected Discharge Date: 10/10/2023                  ALAN OVALLES MD  Hospitalist Service  [facility]  Securely message with [email or web address] (more info)  Text page via [employer] Paging/Directory   ______________________________________________________________________    Interval History   Feeling well today. No acute concerns. No further unresponsive episodes.    Physical Exam   Vital Signs: Temp: 97.3  F (36.3  C) Temp src: Oral BP:  (!) 168/81 Pulse: 82   Resp: 27 SpO2: 98 % O2 Device: None (Room air)    Weight: 140 lbs 0 oz    General Appearance: NAD, well appearing  Respiratory: Normal effort  Cardiovascular: RRR. Normal S1/S2  GI: Soft. NT/ND  Skin: No rashes on exposed skin  Other: No peripheral edema    Medical Decision Making       60 MINUTES SPENT BY ME on the date of service doing chart review, history, exam, documentation & further activities per the note.      Data     I have personally reviewed the following data over the past 24 hrs:    6.3  \   12.4   / 283     132 (L) 101 11.5 /  118 (H)   3.9 21 (L) 0.62 \     ALT: 9 AST: 16 AP: 54 TBILI: 0.8   ALB: 3.4 (L) TOT PROTEIN: 5.2 (L) LIPASE: N/A     Trop: 18 (H) BNP: 86     TSH: 1.49 T4: N/A A1C: N/A     Procal: <0.02 CRP: <3.00 Lactic Acid: N/A       INR:  N/A PTT:  N/A   D-dimer:  0.80 (H) Fibrinogen:  N/A     Ferritin:  56 % Retic:  N/A LDH:  N/A       Imaging results reviewed over the past 24 hrs:   Recent Results (from the past 24 hour(s))   Cervical spine CT w/o contrast    Narrative    EXAM: CT CERVICAL SPINE W/O CONTRAST  LOCATION: [facility]  DATE: 10/8/2023    INDICATION: syncope posterior neck pain  COMPARISON: None.  TECHNIQUE: Routine CT Cervical Spine without IV contrast. Multiplanar reformats. Dose reduction techniques were used.    FINDINGS:  VERTEBRA: Exaggerated cervical lordosis. Vertebral body height is normal. Slight retrolisthesis of C4 and C5. No fracture or posttraumatic subluxation.     CANAL/FORAMINA: No central canal stenosis. Moderate left foraminal stenosis at C4-C5. Severe left foraminal stenosis at C5-C6.    PARASPINAL: 1.5 cm nodule right thyroid gland.      Impression    IMPRESSION:  1.  No fracture or posttraumatic subluxation.  2.  Severe left foraminal stenosis at C5-C6.  3.  Moderate left foraminal stenosis at C4-C5.   CTA Head Neck with Contrast    Narrative    EXAM: CTA HEAD NECK W CONTRAST  LOCATION: [facility]  DATE: 10/8/2023    INDICATION: SYNCOPE HEADACHE NECK PAIN  COMPARISON: None.  CONTRAST: isovue 370 100ml  TECHNIQUE: Head and neck CT angiogram with IV contrast. Noncontrast head CT followed by axial helical CT images of the head and neck vessels obtained during the arterial phase of intravenous contrast administration. Axial 2D reconstructed images and   multiplanar 3D MIP reconstructed images of the head and neck vessels were performed by the technologist. Dose reduction techniques were used. All stenosis measurements made according to NASCET criteria unless otherwise specified.    FINDINGS:   NONCONTRAST HEAD CT:   INTRACRANIAL CONTENTS: No intracranial hemorrhage, extraaxial collection, or mass effect.  No CT evidence of acute infarct. Mild presumed chronic small vessel ischemic changes. Mild generalized volume loss. No hydrocephalus. Chronic lacunar infarcts left   thalamus and right basal ganglia.     VISUALIZED ORBITS/SINUSES/MASTOIDS: No intraorbital abnormality. No paranasal sinus mucosal disease. No middle ear or mastoid effusion.    BONES/SOFT TISSUES: No acute abnormality.    HEAD CTA:  ANTERIOR CIRCULATION: Relatively severe stenosis at the supraclinoid right ICA. No branch occlusion, aneurysm or AVM. Small right A1 segment.    POSTERIOR CIRCULATION: No stenosis/occlusion, aneurysm, or high flow vascular malformation. Balanced vertebral arteries supply a normal basilar artery.     DURAL VENOUS SINUSES: Not well evaluated on a technical basis.    NECK CTA:  RIGHT CAROTID: No measurable stenosis or dissection.    LEFT CAROTID: No measurable stenosis or dissection.    VERTEBRAL ARTERIES: No focal stenosis or dissection. Balanced vertebral arteries.    AORTIC ARCH: Vascular variant aortic arch origin left vertebral artery.  No significant stenosis at the origin of the great vessels.    NONVASCULAR STRUCTURES: Unremarkable.      Impression    IMPRESSION:   HEAD CT:  No acute intracranial  process.    HEAD CTA:  1.  No branch occlusion, aneurysm or AV malformation.  2.  Relatively severe stenosis at the supraclinoid right ICA.    NECK CTA:  No measurable stenosis or dissection.     CTA Chest Abdomen Pelvis w Contrast    Narrative    EXAM: CTA CHEST ABDOMEN PELVIS W CONTRAST  LOCATION: [facility]  DATE: 10/8/2023    INDICATION: syncope left flank pain  COMPARISON: None.  TECHNIQUE: CT angiogram chest abdomen pelvis during arterial phase of injection of IV contrast. 2D and 3D MIP reconstructions were performed by the CT technologist. Dose reduction techniques were used.   CONTRAST: isovue 370 100ml    FINDINGS:   CT ANGIOGRAM CHEST, ABDOMEN, AND PELVIS: No hyperdense acute aortic intramural hematoma on the noncontrast series. Following the administration of IV contrast, the ascending thoracic aorta is ectatic, measuring 3.8 cm in diameter. The remainder of the   thoracoabdominal aorta is of normal caliber. There is diffuse atherosclerotic change but no dissection. Four-vessel aortic arch with patent arch vessels. The abdominal aortic branch vessels are patent. The iliac and proximal femoral arteries are patent.   There is no central pulmonary embolism.    LUNGS AND PLEURA: Bibasilar atelectasis, left greater than right, with passive atelectasis along the elevated left hemidiaphragm. No acute airspace infiltrate. No pneumothorax or pleural effusion.    MEDIASTINUM/AXILLAE: Heart size within normal limits. No pericardial effusion. Hypodense right thyroid lobe nodule measuring 1.5 cm can be evaluated further with ultrasound on an outpatient basis if clinically warranted.    CORONARY ARTERY CALCIFICATION: Mild to moderate    HEPATOBILIARY: Absent gallbladder. Liver within normal limits.    PANCREAS: Normal.    SPLEEN: Normal.    ADRENAL GLANDS: Normal right adrenal. Multiple small left adrenal nodules measuring up to 12 mm which are technically indeterminant.    KIDNEYS/BLADDER:  Benign left midpole cortical cyst. Mild cortical thinning, left greater than right. No hydronephrosis. Nondistended bladder.    BOWEL: Distended stomach with air-fluid level in the high left upper quadrant. Compression of the distal stomach near the antropyloric junction in the high left upper quadrant adjacent to stool filled loops of redundant colon as seen on image 102 of   series 4. There is stool throughout the colon, with a large amount of semisolid stool in the rectum which is distended to approximately 8 x 9 cm in diameter. No free air.    LYMPH NODES: Subcentimeter retroperitoneal lymph nodes.    PELVIC ORGANS: The uterus is atrophic or absent.    MUSCULOSKELETAL: Left hip arthroplasty in place. Severe osteopenia.      Impression    IMPRESSION:  1.  Negative for aortic dissection.    2.  Stool present throughout the colon, with large amount of semisolid stool in the rectum.    3.  Elevated left hemidiaphragm with distended stomach in the high left upper quadrant. Loops of stool-filled, redundant large bowel in the region of the distal stomach may result in a functional gastric outlet obstruction. Consider nasogastric   decompression.     XR Knee Right 1/2 Views    Narrative    EXAM: XR KNEE RIGHT 1/2 VIEWS  LOCATION: [facility]  DATE: 10/9/2023    INDICATION: knee pain  COMPARISON: None.      Impression    IMPRESSION: There is no acute fracture or dislocation. 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 Joint body in the suprapatellar pouch. Joint effusion.   MR Brain w/o Contrast    Narrative    EXAM: MR BRAIN W/O CONTRAST  LOCATION: [facility]  DATE: 10/9/2023    INDICATION: Syncope. Headache. Neck pain.  COMPARISON: CT angiogram head and neck 10/08/2023.  TECHNIQUE: Routine multiplanar multisequence head MRI without intravenous contrast.    FINDINGS:  INTRACRANIAL CONTENTS: No acute or subacute infarct. Chronic lacunar infarct in the left  thalamus. Chronic lacunar type infarcts in the right cerebellar hemisphere. There are a few foci of decreased signal on gradient echo imaging in the bilateral   cerebral hemispheres. No mass, acute hemorrhage, or extra-axial fluid collections. Scattered nonspecific T2/FLAIR hyperintensities within the cerebral white matter and yoly most consistent with mild chronic microvascular ischemic change. Mild to moderate   generalized cerebral atrophy. No hydrocephalus. Mild to moderate cerebellar atrophy.     SELLA: No abnormality accounting for technique.    OSSEOUS STRUCTURES/SOFT TISSUES: Normal marrow signal. The major intracranial vascular flow voids are maintained.     ORBITS: No abnormality accounting for technique.     SINUSES/MASTOIDS: No paranasal sinus mucosal disease. No middle ear or mastoid effusion.       Impression    IMPRESSION:  1.  No acute infarct, intracranial hemorrhage, or intracranial mass.  2.  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  3.  Generalized brain atrophy and presumed microvascular ischemic changes as detailed above.   Echocardiogram Complete   Result Value    LVEF  > 65%    [facility]    [id number]  [id number]  [id number]  [id number]^KATIA^BRYAN^[place] [address]     Name: ALIZE BHAGAT  MRN: [id number]  : 1940  Study Date: 10/09/2023 08:10 AM  Age: 83 yrs  Gender: Female  Patient Location: [facility]  Reason For Study: Syncope, Stress Syncope and Collapse  Ordering Physician: BRYAN MONTALVO  Performed By: AVERY     BSA: 1.8 m2  Height: 70 in  Weight: 140 lb  BP: 145/71 mmHg  ______________________________________________________________________________  Procedure  Complete Portable Echo Adult. Definity (NDC #87733-207) given intravenously.  2.0ml lot # [id number]. Technically difficult study.Extremely difficult acoustic  windows despite the use of contrast for endcardial border definition. No  hemodynamically  significant valvular abnormalities on 2D or color flow  imaging. There is no comparison study available.  ______________________________________________________________________________  Interpretation Summary     Technically difficult study.Extremely difficult acoustic windows despite the  use of contrast for endcardial border definition.  Left ventricular function is normal.The ejection fraction is > 65%. Left  ventricular diastolic function is abnormal.  Normal right ventricle size and systolic function.  With limited visualization, no hemodynamically significant valvular  abnormalities on 2D or color flow imaging.  There is no comparison study available.  ______________________________________________________________________________  Left Ventricle  The left ventricle is normal in size. Left ventricular function is normal.The  ejection fraction is > 65%. Left ventricular diastolic function is abnormal.  No regional wall motion abnormalities noted.     Right Ventricle  Normal right ventricle size and systolic function.     Atria  Normal left atrial size. Right atrial size is normal. There is no color  Doppler evidence of an atrial shunt.     Mitral Valve  Mitral valve leaflets appear normal. There is no evidence of mitral stenosis  or clinically significant mitral regurgitation.     Tricuspid Valve  Tricuspid valve leaflets appear normal. There is no evidence of tricuspid  stenosis or clinically significant tricuspid regurgitation. Right ventricular  systolic pressure could not be approximated due to inadequate tricuspid  regurgitation.     Aortic Valve  The aortic valve is trileaflet. Aortic valve leaflets appear normal. There is  no evidence of aortic stenosis or clinically significant aortic regurgitation.     Pulmonic Valve  The pulmonic valve is not well visualized. This degree of valvular  regurgitation is within normal limits. There is trace pulmonic valvular  regurgitation.     Vessels  The aorta root is  normal. Normal size ascending aorta. IVC diameter <2.1 cm  collapsing >50% with sniff suggests a normal RA pressure of 3 mmHg.     Pericardium  There is no pericardial effusion.     Rhythm  Sinus rhythm was noted.  ______________________________________________________________________________  MMode/2D Measurements & Calculations  LVIDd: 3.7 cm  LVIDs: 2.6 cm  FS: 30.9 %  Ao root diam: 2.7 cm  LVOT diam: 2.1 cm  LVOT area: 3.5 cm2  Ao root diam index Ht(cm/m): 1.5  Ao root diam index BSA (cm/m2): 1.5  LA Volume (BP): 30.0 ml  LA Volume Index (BP): 16.8 ml/m2     LA Volume Indexed (AL/bp): 17.8 ml/m2  TAPSE: 2.1 cm     Doppler Measurements & Calculations  MV E max shon: 80.2 cm/sec  MV A max shon: 133.0 cm/sec  MV E/A: 0.60  MV max P.9 mmHg  MV mean PG: 3.0 mmHg  MV V2 VTI: 34.0 cm  MVA(VTI): 2.4 cm2  MV dec slope: 177.0 cm/sec2  MV dec time: 0.45 sec  Ao V2 max: 137.0 cm/sec  Ao max P.0 mmHg  Ao V2 mean: 101.0 cm/sec  Ao mean P.0 mmHg  Ao V2 VTI: 29.0 cm  CARRI(I,D): 2.8 cm2  CARRI(V,D): 3.1 cm2  LV V1 max P.9 mmHg  LV V1 max: 121.0 cm/sec  LV V1 VTI: 23.8 cm  SV(LVOT): 82.4 ml  SI(LVOT): 46.0 ml/m2     AV Shon Ratio (DI): 0.88  CARRI Index (cm2/m2): 1.6  E/E' av.0  Lateral E/e': 11.9  Medial E/e': 16.0  RV S Shon: 10.9 cm/sec     ______________________________________________________________________________  Report approved by: Swapna Palma 10/09/2023 11:13 AM

## 2023-10-09 NOTE — ED PROVIDER NOTES
EMERGENCY DEPARTMENT NOTE     Name: Alisson Gooden    Age/Sex: 83 year old female   MRN: [id number]   Evaluation Date & Time:  10/8/2023  9:25 PM    PCP:    Tyree Gastelmu   ED Provider: Ricardo Novoa D.O.       CHIEF COMPLAINT    Abdominal Pain and Syncope       DIAGNOSIS & DISPOSITION/MEDICAL DECISION MAKING     1. Syncope, unspecified syncope type        Alisson Gooden is a 83 year old female with relevant past history of HTN  who presents to the emergency department for evaluation of syncope.    Differential  diagnosis considered included but not limited to subarachnoid hemorrhage or leakage, vertebral or carotid artery dissection, pulmonary embolism, thoracic aortic dissection, abdominal aortic aneurysm with rupture or leakage, GI bleeding, sepsis, seizure,ACS, cardiac arrhythmia    Medical Decision Making  Patient on exam had left upper quadrant tenderness.  Cardiac and pulmonary exam normal.  She also reported posterior neck tenderness and left-sided headache.  No carotid bruit or definitive cervical spine tenderness.  Patient also complained of right knee pain and on exam had small effusion but no erythema or warmth to suggest septic arthritis.  Radiology readings did not crossover into the computer for uncertain reasons.  I contacted [facility] and results as noted below are reviewed from faxed reports.  CTA of neck vessels showed no intracranial process including intracranial hemorrhage or aneurysm.  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  CTA of the chest abdomen and pelvis evidence of pulmonary embolism, pericardial effusion, focal infiltrate.  Thoracoabdominal or aorta normal in caliber.  Patient had mild to moderate coronary artery calcifications.  CT of the cervical spine without fracture.  Knee x-ray showed degenerative changes with a loose body in the infrapatellar with small to moderate effusion.  EKG was nonischemic, initial troponin 25 with no delta  "change.  131, renal function normal, glucose 147, WBC 7.8, hemoglobin 13.6 analysis negative for pyuria bacteriuria or hematuria  Has remained hemodynamically stable, no arrhythmia noted on the monitor.  Etiology of the syncope is not identified cannot exclude cardiac arrhythmia.  Patient will be admitted to cardiac telemetry observation for further evaluation.    Interventions: IV ketorolac  Discharge Vital Signs:/67 (BP Location: Right arm, Patient Position: Semi-Kirk's, Cuff Size: Adult Regular)   Pulse 76   Temp 97.7  F (36.5  C) (Oral)   Resp 15   Ht 1.778 m (5' 10\")   Wt 63.5 kg (140 lb)   SpO2 95%   BMI 20.09 kg/m       DISPOSITION: To cardiac telemetry observation/[facility]    Diagnostic studies:  Imaging:  Cervical spine CT w/o contrast    (Results Pending)   CTA Head Neck with Contrast    (Results Pending)   CTA Chest Abdomen Pelvis w Contrast    (Results Pending)   XR Knee Right 1/2 Views    (Results Pending)   Echocardiogram Complete    (Results Pending)   Echocardiogram Complete    (Results Pending)      Lab:  Labs Ordered and Resulted from Time of ED Arrival to Time of ED Departure   BASIC METABOLIC PANEL - Abnormal       Result Value    Sodium 131 (*)     Potassium 4.1      Chloride 97 (*)     Carbon Dioxide (CO2) 24      Anion Gap 10      Urea Nitrogen 13.5      Creatinine 0.75      GFR Estimate 78      Calcium 9.6      Glucose 147 (*)    HEPATIC FUNCTION PANEL - Abnormal    Protein Total 5.8 (*)     Albumin 3.8      Bilirubin Total 0.9      Alkaline Phosphatase 60      AST 17      ALT 9      Bilirubin Direct 0.22     TROPONIN T, HIGH SENSITIVITY - Abnormal    Troponin T, High Sensitivity 20 (*)    ROUTINE UA WITH MICROSCOPIC REFLEX TO CULTURE - Abnormal    Color Urine Yellow      Appearance Urine Turbid (*)     Glucose Urine Negative      Bilirubin Urine Negative      Ketones Urine 20 (*)     Specific Gravity Urine 1.017      Blood Urine Negative      pH Urine 7.5 (*)     Protein Albumin " Urine 20 (*)     Urobilinogen Urine 6.0 (*)     Nitrite Urine Negative      Leukocyte Esterase Urine Negative      Mucus Urine Present (*)     Amorphous Crystals Urine Few (*)     RBC Urine 0      WBC Urine 2      Hyaline Casts Urine 8 (*)    TROPONIN T, HIGH SENSITIVITY - Abnormal    Troponin T, High Sensitivity 17 (*)    D DIMER QUANTITATIVE - Abnormal    D-Dimer Quantitative 1.11 (*)    CRP INFLAMMATION - Normal    CRP Inflammation <3.00     PROCALCITONIN - Normal    Procalcitonin <0.02     URIC ACID - Normal    Uric Acid 3.4     NT PROBNP INPATIENT - Normal    N terminal Pro BNP Inpatient 86     CBC WITH PLATELETS AND DIFFERENTIAL    WBC Count 7.8      RBC Count 4.48      Hemoglobin 13.6      Hematocrit 39.9      MCV 89      MCH 30.4      MCHC 34.1      RDW 13.9      Platelet Count 327      % Neutrophils 68      % Lymphocytes 26      % Monocytes 6      Mids % (Monos, Eos, Basos)        % Eosinophils 0      % Basophils 0      % Immature Granulocytes 0      NRBCs per 100 WBC 0      Absolute Neutrophils 5.2      Absolute Lymphocytes 2.0      Absolute Monocytes 0.4      Mids Abs (Monos, Eos, Basos)        Absolute Eosinophils 0.0      Absolute Basophils 0.0      Absolute Immature Granulocytes 0.0      Absolute NRBCs 0.0     COMPREHENSIVE METABOLIC PANEL   TROPONIN T, HIGH SENSITIVITY   ERYTHROCYTE SEDIMENTATION RATE AUTO   VITAMIN B12   FOLATE   FERRITIN   TSH WITH FREE T4 REFLEX   T3 TOTAL   CBC WITH PLATELETS AND DIFFERENTIAL               Triage note reviewed:     History:  Supplemental history from: Family Member/Significant Other and EMS  External Record(s) reviewed: Care office visit September 29, 2023    Work Up:  Chart documentation includes differential considered and any EKGs or imaging independently interpreted by provider, where specified.  In additional to work up documented, I considered the following work up: N/A    External consultation:  Discussion of management with another provider:  Hospitalist    Complicating factors:  Care impacted by chronic illness: Hyperlipidemia and Hypertension  Care affected by social determinants of health: N/A    Disposition considerations: Admit.    At the conclusion of the encounter I discussed the results of all of the tests and the disposition. The questions were answered. The patient or family acknowledged understanding and was agreeable with the care plan.    TOTAL CRITICAL CARE TIME (EXCLUDING PROCEDURES): Not applicable    PROCEDURES:   None    EMERGENCY DEPARTMENT COURSE   9:39 PM I met with the patient to gather history and to perform my initial exam.  We discussed treatment options and the plan for care while in the Emergency Department.    ED INTERVENTIONS     Medications   HYDROmorphone (PF) (DILAUDID) injection 0.3 mg (0.3 mg Intravenous $Given 10/9/23 0421)   melatonin tablet 1 mg (has no administration in time range)   ondansetron (ZOFRAN ODT) ODT tab 4 mg ( Oral See Alternative 10/9/23 0420)     Or   ondansetron (ZOFRAN) injection 4 mg (4 mg Intravenous $Given 10/9/23 0420)   pantoprazole (PROTONIX) IV push injection 40 mg (has no administration in time range)   sodium chloride 0.9 % infusion (has no administration in time range)   iopamidol (ISOVUE-370) solution 100 mL (100 mLs Intravenous $Given 10/8/23 1940)   ketorolac (TORADOL) injection 15 mg (15 mg Intravenous $Given 10/8/23 7065)       DISCHARGE MEDICATIONS        Review of your medicines        UNREVIEWED medicines. Ask your doctor about these medicines        Dose / Directions   ibuprofen 200 MG tablet  Commonly known as: ADVIL/MOTRIN      Dose: 400 mg  [IBUPROFEN (ADVIL,MOTRIN) 200 MG TABLET] Take 400 mg by mouth every 8 (eight) hours as needed for pain.  Refills: 0     lisinopril 40 MG tablet  Commonly known as: ZESTRIL  Ask about: Which instructions should I use?      Dose: 40 mg  Take 40 mg by mouth daily  Refills: 0     Loratadine-D 12HR 5-120 MG 12 hr tablet  Generic drug:  loratadine-pseudoePHEDrine      Dose: 1 tablet  Take 1 tablet by mouth daily as needed for allergies  Refills: 0     omeprazole 20 MG DR capsule  Commonly known as: PriLOSEC      Dose: 20 mg  Take 20 mg by mouth daily as needed (acid reflux)  Refills: 0     Vitamin D3 25 mcg (1000 units) tablet  Commonly known as: CHOLECALCIFEROL      Dose: 1,000 Units  [CHOLECALCIFEROL, VITAMIN D3, 1,000 UNIT (25 MCG) TABLET] Take 1,000 Units by mouth daily.  Refills: 0                INFORMATION SOURCE AND LIMITATIONS    History/Exam limitations:  N/A   Patient information was obtained from: The patient and her daughters  Use of : N/A    HISTORY OF PRESENT ILLNESS   Alisson Gooden is a 83 year old year old female with a relevant past history of HLD, HTN, and hyponatremia, who presents to this ED by ambulance for evaluation of abdominal pain and syncope.    Prior to arrival, the patient has an approximately 5 minute unresponsive episode, barely arouseable to sternal rubbing. No falls. EMS was initially called for evaluation of abdominal pain which radiated to the back. She had a work up in clinic last week for episodes of light-headedness. She has had these episodes intermittently for years, but last weeks she had 4 episodes in 6 days which was unusual. She also endorses dysuria. She has also noticed some increased posterior neck pain. She has had constipation and headaches, but that is not new. She denies nausea, vomiting, chest pain, shortness of breath, diarrhea, and melena. No other complaints at this time.     REVIEW OF SYSTEMS:   All other systems reviewed and are negative except as noted above in HPI.    PATIENT HISTORY   History reviewed. No pertinent past medical history.  Patient Active Problem List   Diagnosis    Traumatic closed nondisplaced fracture of neck of femur, left, initial encounter (H)    Hip fracture, left, closed, initial encounter (H)    Benign essential hypertension    Mixed hyperlipidemia     Sciatica    Vitamin B12 deficiency (non anemic)    Hyponatremia    Syncope     Past Surgical History:   Procedure Laterality Date    APPENDECTOMY      CHOLECYSTECTOMY      ECTOPIC PREGNANCY SURGERY      ZZC PARTIAL HIP REPLACEMENT Left 5/15/2021    Procedure: LEFT HEMIARTHROPLASTY, HIP, BIPOLAR;  Surgeon: Jermaine Greenfield MD;  Location: [facility] [place];  Service: Orthopedics       No Known Allergies    OUTPATIENT MEDICATIONS     New Prescriptions    No medications on file      Vitals:    10/09/23 0336 10/09/23 0400 10/09/23 0430 10/09/23 0500   BP: (!) 164/77 (!) 159/77 (!) 142/65 137/67   BP Location:  Right arm Right arm Right arm   Patient Position:  Semi-Kirk's Semi-Kirk's Semi-Kirk's   Cuff Size:  Adult Regular Adult Regular Adult Regular   Pulse: 81 81 81 76   Resp: 19 13 15 15   Temp:       TempSrc:       SpO2: 98% 96% 95% 95%   Weight:       Height:           Physical Exam   Constitutional: Oriented to person, place, and time. Appears well-developed and well-nourished.   HEENT:    Head: Atraumatic. Right scalp prior trephination.   Neck: Normal range of motion. Neck supple.   Cardiovascular: Normal rate, regular rhythm and normal heart sounds.    Pulmonary/Chest: Normal effort  and breath sounds normal.   Abdominal: Soft. Bowel sounds are normal.   Musculoskeletal: Normal range of motion.   Neurological: Alert and oriented to person, place, and time. Normal strength. No sensory deficit. No cranial nerve deficit.  Skin: Skin is warm and dry.   Psychiatric: Normal mood and affect. Behavior is normal. Thought content normal.     DIAGNOSTICS    LABORATORY FINDINGS (REVIEWED AND INTERPRETED):  Labs Ordered and Resulted from Time of ED Arrival to Time of ED Departure   BASIC METABOLIC PANEL - Abnormal       Result Value    Sodium 131 (*)     Potassium 4.1      Chloride 97 (*)     Carbon Dioxide (CO2) 24      Anion Gap 10      Urea Nitrogen 13.5      Creatinine 0.75      GFR Estimate 78      Calcium 9.6       Glucose 147 (*)    HEPATIC FUNCTION PANEL - Abnormal    Protein Total 5.8 (*)     Albumin 3.8      Bilirubin Total 0.9      Alkaline Phosphatase 60      AST 17      ALT 9      Bilirubin Direct 0.22     TROPONIN T, HIGH SENSITIVITY - Abnormal    Troponin T, High Sensitivity 20 (*)    ROUTINE UA WITH MICROSCOPIC REFLEX TO CULTURE - Abnormal    Color Urine Yellow      Appearance Urine Turbid (*)     Glucose Urine Negative      Bilirubin Urine Negative      Ketones Urine 20 (*)     Specific Gravity Urine 1.017      Blood Urine Negative      pH Urine 7.5 (*)     Protein Albumin Urine 20 (*)     Urobilinogen Urine 6.0 (*)     Nitrite Urine Negative      Leukocyte Esterase Urine Negative      Mucus Urine Present (*)     Amorphous Crystals Urine Few (*)     RBC Urine 0      WBC Urine 2      Hyaline Casts Urine 8 (*)    TROPONIN T, HIGH SENSITIVITY - Abnormal    Troponin T, High Sensitivity 17 (*)    D DIMER QUANTITATIVE - Abnormal    D-Dimer Quantitative 1.11 (*)    CRP INFLAMMATION - Normal    CRP Inflammation <3.00     PROCALCITONIN - Normal    Procalcitonin <0.02     URIC ACID - Normal    Uric Acid 3.4     NT PROBNP INPATIENT - Normal    N terminal Pro BNP Inpatient 86     CBC WITH PLATELETS AND DIFFERENTIAL    WBC Count 7.8      RBC Count 4.48      Hemoglobin 13.6      Hematocrit 39.9      MCV 89      MCH 30.4      MCHC 34.1      RDW 13.9      Platelet Count 327      % Neutrophils 68      % Lymphocytes 26      % Monocytes 6      Mids % (Monos, Eos, Basos)        % Eosinophils 0      % Basophils 0      % Immature Granulocytes 0      NRBCs per 100 WBC 0      Absolute Neutrophils 5.2      Absolute Lymphocytes 2.0      Absolute Monocytes 0.4      Mids Abs (Monos, Eos, Basos)        Absolute Eosinophils 0.0      Absolute Basophils 0.0      Absolute Immature Granulocytes 0.0      Absolute NRBCs 0.0     COMPREHENSIVE METABOLIC PANEL   TROPONIN T, HIGH SENSITIVITY   ERYTHROCYTE SEDIMENTATION RATE AUTO   VITAMIN B12   FOLATE    FERRITIN   TSH WITH FREE T4 REFLEX   T3 TOTAL   CBC WITH PLATELETS AND DIFFERENTIAL         IMAGING (REVIEWED AND INTERPRETED):  Cervical spine CT w/o contrast    (Results Pending)   CTA Head Neck with Contrast    (Results Pending)   CTA Chest Abdomen Pelvis w Contrast    (Results Pending)   XR Knee Right 1/2 Views    (Results Pending)   Echocardiogram Complete    (Results Pending)   Echocardiogram Complete    (Results Pending)         ECG (REVIEWED AND INTERPRETED):   ECG:   Performed at: 21:24  HR:  66 bpm  Rhythm: Sinus  Axis: 50  QRS duration: 78  QTC: 480  ST changes: No ST segment elevation or depression, no T wave inversion,No Q wave  Interpretation: Sinus rhythm. bilateral enlargement. septal infarct, age undetermined.   Compared to most recent ECG from: When compared with ECG of 15-May-2021, septal infarct is now present.    I have reviewed the patient's ECG, with comments made as listed above. Please see scanned image for full interpretation.         I, Bronwyn Taylor, am serving as a scribe to document services personally performed by Ricardo Novoa D.O., based on my observation and the provider s statements to me.    I, Ricardo Novoa D.O., attest that Bronwyn Taylor is acting in a scribe capacity, has observed my performance of the services and has documented them in accordance with my direction.    Ricardo Novoa D.O.  EMERGENCY MEDICINE   10/08/23  [facility] EMERGENCY DEPARTMENT  [address] [place] [id number]  [phone]  Dept: [phone]     Ricardo Novoa DO  10/09/23 0530

## 2023-10-09 NOTE — PLAN OF CARE
Goal Outcome Evaluation:       Pt transferred from ED at around 6:30 pm, pt alert and oriented, denied pain, pt settled in her room.

## 2023-10-09 NOTE — ED NOTES
Neuro check performed. No deficits noted, no difficulty swallowing. Breakfast tray delivered. Patient reports not having much of an appetite.

## 2023-10-09 NOTE — ED NOTES
Per pt she was seen by PT and they did some walking in the room.  Pt eating a fruit cup still verbalizing does not have much appetite.  daughter left for home.

## 2023-10-09 NOTE — PROGRESS NOTES
[facility]  OUTPATIENT PHYSICAL THERAPY EVALUATION  PLAN OF TREATMENT FOR OUTPATIENT REHABILITATION  (COMPLETE FOR INITIAL CLAIMS ONLY)  Patient's Last Name, First Name, M.I.  YOB: 1940  Alisson Gooden                        Provider's Name  [facility] Medical Record No.  [id number]                             Onset Date:  10/08/23   Start of Care Date:  10/09/23   Type:     _X_PT   ___OT   ___SLP Medical Diagnosis:  Syncope              PT Diagnosis:  Impaired functional mobility Visits from SOC:  1     See note for plan of treatment, functional goals and certification details    I CERTIFY THE NEED FOR THESE SERVICES FURNISHED UNDER        THIS PLAN OF TREATMENT AND WHILE UNDER MY CARE     (Physician co-signature of this document indicates review and certification of the therapy plan).                10/09/23 1300   Appointment Info   Signing Clinician's Name / Credentials (PT) Mei Gonzalez, PT, DPT   Living Environment   People in Home spouse   Current Living Arrangements house   Home Accessibility stairs within home   Number of Stairs, Within Home, Primary greater than 10 stairs  (8 + 8)   Stair Railings, Within Home, Primary railings safe and in good condition   Transportation Anticipated family or friend will provide   Living Environment Comments Patient reports she has been staying on the main level with her spouse.   Self-Care   Usual Activity Tolerance good   Current Activity Tolerance moderate   Equipment Currently Used at Home cane, straight   Fall history within last six months no   Activity/Exercise/Self-Care Comment Patient reports she uses a cane outside the house.   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   Strength (Manual Muscle Testing)   Strength (Manual Muscle Testing) strength is WFL   Bed Mobility   Bed Mobility supine-sit;sit-supine   Supine-Sit [place] (Bed Mobility) independent   Sit-Supine [place] (Bed Mobility) independent   Comment, (Bed Mobility) IND supine<>sit frm paolo.   Transfers   Transfers sit-stand transfer   Impairments Contributing to Impaired Transfers pain   Sit-Stand Transfer   Sit-Stand [place] (Transfers) supervision;verbal cues   Assistive Device (Sit-Stand Transfers) other (see comments)  (None)   Gait/Stairs (Locomotion)   [place] Level (Gait) supervision;verbal cues   Assistive Device (Gait) other (see comments)  (None)   Distance in Feet 10'   Pattern (Gait) step-through   Deviations/Abnormal Patterns (Gait) antalgic;gait speed decreased;stride length decreased;weight shifting decreased   Comment, (Gait/Stairs) Unable to assess stairs due to environmental limitations.   Clinical Impression   Criteria for Skilled Therapeutic Intervention Yes, treatment indicated   PT Diagnosis (PT) Impaired functional mobility   Influenced by the following impairments Fatigue, R knee pain and swelling   Functional limitations due to impairments Transfers, gait, stairs   Clinical Presentation (PT Evaluation " Complexity) Stable/Uncomplicated   Clinical Presentation Rationale Patient presents as medically diagnosed.   Clinical Decision Making (Complexity) low complexity   Planned Therapy Interventions (PT) balance training;gait training;home exercise program;patient/family education;ROM (range of motion);stair training;strengthening;stretching;transfer training;home program guidelines   Anticipated Equipment Needs at Discharge (PT) cane, straight   Risk & Benefits of therapy have been explained evaluation/treatment results reviewed;care plan/treatment goals reviewed;patient   PT Total Evaluation Time   PT Eval, Low Complexity Minutes (58073) 15   Physical Therapy Goals   PT Frequency Daily   PT Predicted Duration/Target Date for Goal Attainment 10/16/23   PT Goals Transfers;Gait;Stairs   PT: Transfers Modified independent;Sit to/from stand;Assistive device   PT: Gait Modified independent;Assistive device;150 feet   PT: Stairs Supervision/stand-by assist;8 stairs;Rail on left;Rail on right   Interventions   Interventions Quick Adds Therapeutic Activity   Therapeutic Activity   Therapeutic Activities: dynamic activities to improve functional performance Minutes (93931) 15   Symptoms Noted During/After Treatment Increased pain  (R knee pain)   Treatment Detail/Skilled Intervention Sit<>stand from gurney x 2 and from recliner x 2. Verbal cues for technique to reduce R knee flexion and pain. Patient reported improvement in R knee pain with technique. Patient ambulated around room x 2. Distance limited by IV and lines. SBA without AD. Discussed use of cane for ambulation and non-reciprocal pattern for stairs to manage R knee pain. Patient verbalized understanding.   PT Discharge Planning   PT Plan Transfers, gait with SPC vs. FWW (bring both), stairs   PT Discharge Recommendation (DC Rec) home with assist;home with home care physical therapy   PT Rationale for DC Rec Patient is able to mobilize with SBA. Patient may require use  of an AD at [facility] due to R knee pain. Patient may also benefit from home PT to address R knee pain and mobility at home.   PT Brief overview of current status SBA for transfers and gait in room without AD.   Total Session Time   Timed Code Treatment Minutes 15   Total Session Time (sum of timed and untimed services) 30

## 2023-10-09 NOTE — CONSULTS
[facility]  Neurology Consultation    Patient Name:  Alisson Gooden  MRN:  [id number]    :  1940  Date of Service:  2023  Primary care provider:  Vani Berkowitz      Neurology consultation service was asked to see Alisson Gooden by Dr. Roberts to evaluate for unresponsiveness.    Chief Complaint:  unresponsiveness    History of Present Illness:   Alisson Gooden is a 83 year old female with history of traumatic subdural hematoma s/p craniotomy (not available from the in chart review, no date), HTN, chronic pain epilepsy who presents with encephalopathy in the days prior to presenting to the emergency department.  On chart review, patient's daughter explained that she has had increased fatigue over the past week that is worse when she is standing or walking.  Patient says that she does not drink enough fluids, though otherwise denies any recent falls, chills, nausea vomiting or other signs of illness.    I reviewed her notes and did not see any prior notes from neurology or neurosurgery in our system.  She had a past admission in  for a femoral neck fracture on the left.  I do not see that she is on any antiepileptic medications as an outpatient.    I performed her interview with the patient and using her daughter for collateral information by telephone.  Her traumatic subdural was 10 to 15 years ago.  She does not remember having any seizures, and was not started on any antiepileptic medication.  Her daughter confirms this.  The event that brought her into the hospital was a severe abdominal pain that she had and as her daughter was attempting to attend to her, she saw Alisson medhat ricardo, and had a blank stare on her face.  EMS arrived and the patient described coming to while she was in the ambulance.  In the ambulance she had full awareness of where she was, why she was there, and denied any confusion.  Her daughter thinks that she did have urinary  incontinence in the ambulance as she had to be changed once she arrived to the emergency department.  She did not have dinner the night before, and typically does not drink very much, her daughter needs to count her to drink sufficient fluids.  She describes that she has lightheadedness and syncope/presyncope with some regularity.    ROS  A comprehensive ROS was performed and pertinent findings were included in HPI.     PMH  History reviewed. No pertinent past medical history.  Past Surgical History:   Procedure Laterality Date    APPENDECTOMY      CHOLECYSTECTOMY      ECTOPIC PREGNANCY SURGERY      ZZC PARTIAL HIP REPLACEMENT Left 5/15/2021    Procedure: LEFT HEMIARTHROPLASTY, HIP, BIPOLAR;  Surgeon: Jermaine Greenfield MD;  Location: [facility];  Service: Orthopedics       Medications   I have personally reviewed the patient's medication list.     Allergies  I have personally reviewed the patient's allergy list.     Social History  Social History     Socioeconomic History    Marital status:      Spouse name: Not on file    Number of children: Not on file    Years of education: Not on file    Highest education level: Not on file   Occupational History    Not on file   Tobacco Use    Smoking status: Never    Smokeless tobacco: Never   Substance and Sexual Activity    Alcohol use: Not Currently    Drug use: Not on file    Sexual activity: Not on file   Other Topics Concern    Not on file   Social History Narrative    Not on file     Social Determinants of Health     Financial Resource Strain: Not on file   Food Insecurity: Not on file   Transportation Needs: Not on file   Physical Activity: Not on file   Stress: Not on file   Social Connections: Not on file   Interpersonal Safety: Not on file   Housing Stability: Not on file        Family History    Family History   Problem Relation Age of Onset    Coronary Artery Disease Mother     Gallbladder Disease Father     Coronary Artery Disease Brother      "      Physical Examination   Vitals: BP (!) 148/66   Pulse 78   Temp 97.8  F (36.6  C) (Oral)   Resp 17   Ht 1.778 m (5' 10\")   Wt 63.5 kg (140 lb)   SpO2 96%   BMI 20.09 kg/m    General: Lying in bed, NAD  Head: NC/AT  Eyes: no icterus, op pink and moist  Cardiac: RRR. Extremities warm, no edema.   Respiratory: non-labored on RA  GI: S/NT/ND  Skin: No rash or lesion on exposed skin  Psych: Mood pleasant, affect congruent  Neuro:  Mental status: Awake, alert, attentive, oriented to self, time, place, and circumstance. Language is fluent and coherent with intact comprehension of complex commands, naming and repetition.  Cranial nerves: VFF, PERRL, conjugate gaze, EOMI, facial sensation intact, face symmetric, shoulder shrug strong, tongue/uvula midline, no dysarthria.   Motor: Normal bulk and tone. No abnormal movements. 5/5 strength bilaterally in deltoids, biceps, triceps, hand , hip flexors, hip extensors, knee flexion, knee extension, plantarflexion, dorsiflexion.   Reflexes: Normo-reflexic and symmetric biceps, brachioradialis, triceps, patellae, and achilles. Negative Ge, no clonus, toes down-going.  Sensory: Intact to light touch in proximal and distal aspects of all 4 extremities   Coordination: FNF and HS without ataxia or dysmetria. Rapid alternating movements intact.   Gait: Not assessed     Investigations   I have personally reviewed pertinent labs, tests, and radiological imaging. Discussion of notable findings is included under Impression.     MRI Brain 10/8/23  IMPRESSION:  1.  No acute infarct, intracranial hemorrhage, or intracranial mass.  2.  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  3.  Generalized brain atrophy and presumed microvascular ischemic changes as detailed above.    Was patient transferred from outside hospital?   No    Impression  #Syncope    Ms. Acosta had an event today that was preceded by pain, and marked by reduced muscle " tone throughout her body, possible loss of urinary continence, that was not followed by a postictal state.  When I examined her in the emergency department she remained intact cognitively and throughout the rest of her neurologic exam.  This history, and her personal history of syncope/presyncope makes this diagnosis most likely.  While she does have a remote history of a subdural, she has not had seizures in the past and the clinical history in this case does not fit well with a generalized seizure.  Will recommend continued evaluation and management for syncope.    Recommendations  -Syncope work-up  - Orthostatic vitals  - Inpatient neurology will sign off at this time, no current indications for outpatient neurology follow-up    Thank you for involving Neurology in the care of Alisson Gooden.  Please do not hesitate to call with questions.    Rick Lopez MD    I spent 65 minutes on this consultation including chart review, interview of patient, gathering collateral information from the patient's daughter, examination, coordination of care with her ED provider and documentation.

## 2023-10-09 NOTE — ED NOTES
Patient was alert and oriented. Patient states that her left eye pain is down to 2/10. Pt denies any other pain at this time. MD snider messaged to verify orders for patient diet.

## 2023-10-09 NOTE — ED TRIAGE NOTES
Pt presents to ED via EMS for evaluation of abdominal pain wrapping around to back. Had an approximately 5 minute unresponsive episode, barely arouseable to sternal rubbing. ABCs intact. 16g L AC per EMS. Pt also c/o some neck pain.     Triage Assessment       Row Name 10/08/23 3236       Triage Assessment (Adult)    Airway WDL WDL    Additional Documentation Breath Sounds (Group)       Respiratory WDL    Respiratory WDL WDL       Breath Sounds    Breath Sounds All Fields    All Lung Fields Breath Sounds Anterior:;Posterior:;equal bilaterally;clear       Skin Circulation/Temperature WDL    Skin Circulation/Temperature WDL WDL       Cardiac WDL    Cardiac WDL WDL       Peripheral/Neurovascular WDL    Peripheral Neurovascular WDL WDL       Cognitive/Neuro/Behavioral WDL    Cognitive/Neuro/Behavioral WDL WDL

## 2023-10-09 NOTE — CONSULTS
Care Management Initial Consult    General Information  Assessment completed with: Patient,    Type of CM/SW Visit: Initial Assessment    Primary Care Provider verified and updated as needed: Yes   Readmission within the last 30 days: no previous admission in last 30 days         Advance Care Planning: Advance Care Planning Reviewed:  (HCD forms provided)          Communication Assessment  Patient's communication style: spoken language (English or Bilingual)             Cognitive  Cognitive/Neuro/Behavioral: WDL  Level of Consciousness: alert  Arousal Level: opens eyes spontaneously  Orientation: oriented x 4     Best Language: 0 - No aphasia  Speech: clear    Living Environment:   People in home: spouse     Current living Arrangements: house      Able to return to prior arrangements: yes       Family/Social Support:  Care provided by: self  Provides care for: spouse  Marital Status:   Children          Description of Support System: Supportive, Involved         Current Resources:   Patient receiving home care services: No     Community Resources: None  Equipment currently used at home:  cane  Supplies currently used at home: None          Lifestyle & Psychosocial Needs:  Social Determinants of Health     Food Insecurity: Not on file   Depression: Not on file   Housing Stability: Not on file   Tobacco Use: Low Risk  (10/8/2023)    Patient History     Smoking Tobacco Use: Never     Smokeless Tobacco Use: Never     Passive Exposure: Not on file   Financial Resource Strain: Not on file   Alcohol Use: Not on file   Transportation Needs: Not on file   Physical Activity: Not on file   Interpersonal Safety: Not on file   Stress: Not on file   Social Connections: Not on file       Functional Status:  Prior to admission patient needed assistance:   Dependent ADLs:: Ambulation-cane  Dependent IADLs:: Independent         Additional Information:    Assessment completed with patient. Patient's daughter Shayy present in  room. Patient reports she lives with her spouse in their house. She is independent with ADLs and IADLs. Patient's spouse recently hospitalized and requires assistance. Daughter Shayy is primary support. Patient ambulates with a cane and drives.  Spouse is currently receiving home care with [facility].  Shayy will transport at discharge.          Aida Tao RN

## 2023-10-10 ENCOUNTER — APPOINTMENT (OUTPATIENT)
Dept: PHYSICAL THERAPY | Facility: HOSPITAL | Age: 83
DRG: 312 | End: 2023-10-10
Payer: COMMERCIAL

## 2023-10-10 VITALS
WEIGHT: 145.28 LBS | HEART RATE: 108 BPM | BODY MASS INDEX: 20.8 KG/M2 | HEIGHT: 70 IN | SYSTOLIC BLOOD PRESSURE: 159 MMHG | TEMPERATURE: 97.9 F | RESPIRATION RATE: 18 BRPM | OXYGEN SATURATION: 94 % | DIASTOLIC BLOOD PRESSURE: 77 MMHG

## 2023-10-10 LAB
ANION GAP SERPL CALCULATED.3IONS-SCNC: 6 MMOL/L (ref 7–15)
BUN SERPL-MCNC: 12.4 MG/DL (ref 8–23)
CALCIUM SERPL-MCNC: 8.6 MG/DL (ref 8.8–10.2)
CHLORIDE SERPL-SCNC: 102 MMOL/L (ref 98–107)
CREAT SERPL-MCNC: 0.81 MG/DL (ref 0.51–0.95)
DEPRECATED HCO3 PLAS-SCNC: 25 MMOL/L (ref 22–29)
EGFRCR SERPLBLD CKD-EPI 2021: 71 ML/MIN/1.73M2
GLUCOSE SERPL-MCNC: 92 MG/DL (ref 70–99)
POTASSIUM SERPL-SCNC: 4.8 MMOL/L (ref 3.4–5.3)
SODIUM SERPL-SCNC: 133 MMOL/L (ref 135–145)

## 2023-10-10 PROCEDURE — 99239 HOSP IP/OBS DSCHRG MGMT >30: CPT | Performed by: STUDENT IN AN ORGANIZED HEALTH CARE EDUCATION/TRAINING PROGRAM

## 2023-10-10 PROCEDURE — 97110 THERAPEUTIC EXERCISES: CPT | Mod: GP

## 2023-10-10 PROCEDURE — 97116 GAIT TRAINING THERAPY: CPT | Mod: GP

## 2023-10-10 PROCEDURE — 36415 COLL VENOUS BLD VENIPUNCTURE: CPT | Performed by: STUDENT IN AN ORGANIZED HEALTH CARE EDUCATION/TRAINING PROGRAM

## 2023-10-10 PROCEDURE — 80048 BASIC METABOLIC PNL TOTAL CA: CPT | Performed by: STUDENT IN AN ORGANIZED HEALTH CARE EDUCATION/TRAINING PROGRAM

## 2023-10-10 PROCEDURE — 250N000013 HC RX MED GY IP 250 OP 250 PS 637: Performed by: STUDENT IN AN ORGANIZED HEALTH CARE EDUCATION/TRAINING PROGRAM

## 2023-10-10 RX ADMIN — PANTOPRAZOLE SODIUM 40 MG: 40 TABLET, DELAYED RELEASE ORAL at 06:30

## 2023-10-10 RX ADMIN — POLYETHYLENE GLYCOL 3350 17 G: 17 POWDER, FOR SOLUTION ORAL at 09:02

## 2023-10-10 RX ADMIN — SENNOSIDES AND DOCUSATE SODIUM 1 TABLET: 50; 8.6 TABLET ORAL at 09:02

## 2023-10-10 RX ADMIN — LISINOPRIL 40 MG: 20 TABLET ORAL at 09:02

## 2023-10-10 ASSESSMENT — ACTIVITIES OF DAILY LIVING (ADL)
ADLS_ACUITY_SCORE: 22
ADLS_ACUITY_SCORE: 35
DRESSING/BATHING_DIFFICULTY: NO
TOILETING_ISSUES: NO
CONCENTRATING,_REMEMBERING_OR_MAKING_DECISIONS_DIFFICULTY: NO
ADLS_ACUITY_SCORE: 24
WALKING_OR_CLIMBING_STAIRS_DIFFICULTY: NO
ADLS_ACUITY_SCORE: 22
ADLS_ACUITY_SCORE: 35
ADLS_ACUITY_SCORE: 22
ADLS_ACUITY_SCORE: 35
EQUIPMENT_CURRENTLY_USED_AT_HOME: CANE, STRAIGHT
WEAR_GLASSES_OR_BLIND: YES
FALL_HISTORY_WITHIN_LAST_SIX_MONTHS: NO
DOING_ERRANDS_INDEPENDENTLY_DIFFICULTY: NO
CHANGE_IN_FUNCTIONAL_STATUS_SINCE_ONSET_OF_CURRENT_ILLNESS/INJURY: NO
DIFFICULTY_EATING/SWALLOWING: NO

## 2023-10-10 NOTE — DISCHARGE SUMMARY
[facility]  Hospitalist Discharge Summary      Date of Admission:  10/8/2023  Date of Discharge:  10/10/2023  2:25 PM  Discharging Provider: ALAN OVALLES MD  Discharge Service: Hospitalist Service    Discharge Diagnoses     #Acute episode of unresponsiveness  #Recurrent presyncope  #Acute abdominal pain  #NIMI  #Primary HTN  #Acute right knee pain and swelling  #Right 5th toe erythema  #History of subdural hematoma  #Severe stenosis of supraclinoid right ICA  #Chronic hyponatremia    Clinically Significant Risk Factors          Follow-ups Needed After Discharge   Follow-up Appointments     Follow-up and recommended labs and tests       Follow up with primary care provider, Vani Berkowitz, at 14 days for   hospital follow- up.  The following labs/tests are recommended: results   review of cardiac monitor            Unresulted Labs Ordered in the Past 30 Days of this Admission       No orders found from 9/8/2023 to 10/9/2023.            Discharge Disposition   Discharged to home  Condition at discharge: Stable    Hospital Course   Patient is an 83-year-old female with a medical history significant for prior history of traumatic subdural hematoma status craniotomy with clot evacuation, hypertension who is being admitted for further evaluation of an acute episode of unresponsiveness.    #Acute episode of unresponsiveness  The patient walked from couch to kitchen table on 10/8 and felt fine. Then while seated she became unresponsive and slumped forward. No trauma. Patient's family called EMS and the episode reportedly lasted about 5 minutes. No recollection and next memory is being in the ambulance. Unclear if patient lost control of bladder but denied a post-ictal state. No rhythmic movements. No palpitations. No flushing/warmth or lightheadedness prior to spell. On admission lab work without significant abnormalities. CT head, c-spine, C/A/P and MRI brain with only chronic  microvascular changes. Unclear etiology. Does not sound like a seizure. Had neurology evaluate and they agreed to no seizure work-up. Possibly arrhythmogenic or orthostatic. Did not recur during hospitalization and review of telemetry unremarkable NSR. Given IVF.  - Discharged with cardiac monitor and recommended PCP follow up    #Recurrent presyncope  The patient and daughter report several months of occasional episodes where the patient will feel a sense that she is going to pass out. Was happening a few times a month but has happened several times in the last 10 days. Patient is always standing for a prolonged period when these happen and the pre-syncope feeling starts suddenly. No lightheadedness, visual changes, dizziness, headaches, palpitations, shaking, loss of awareness or bowel/bladder control. Remembers the events. They last a minute or so. Has never fainted or fallen. Unclear what is causing this but possibly delayed postural hypotension due to prolonged standing or arrhythmia.  - Telemetry here unremarkable NSR  - TTE with poor windows but grossly normal biventricular function, mild HFpEF  - CT C/A/P and MRI brain here with chronic microvascular changes and chronic lacunar infarcts but no cause for pre-syncope or acute changes  - Cardiac monitor as above    #Acute abdominal pain  Developed acute abdominal pain 10/8 that quickly resolved. Passing gas. CT on admission showed left hemidiaphragm elevation with distended stomach and loops of stool-filled large bowel near the distal stomach that may have led to gastric outlet obstruction. Seems to have self-resolved.  - Stool softeners    #NIMI  No symptoms of ACS. HS trop flat ~18.    #Hypertension  - Continue home lisinopril 40mg daily    #Right knee pain  #Right fifth toe erythema  Right knee swollen without erythema or warmth. Right fifth toe with some mild erythema, but no induration of skin break. No history of gout. Was normal earlier in the day but  present on admission. Most likely due to a minor trauma en route to the hospital. Nothing to indicate infection.  - X-ray knee without any acute injury  - PT recommended HH PT    #Prior history of subdural hematoma s/p evacuation  #Severe stenosis of supraclinoid right ICA  CT head without acute process. Incidental finding of right ICA supraclinoid stenosis.  - PCP follow up    #Chronic hyponatremia  Mild, stable    Consultations This Hospital Stay   PHYSICAL THERAPY ADULT IP CONSULT  CARE MANAGEMENT / SOCIAL WORK IP CONSULT  NEUROLOGY IP CONSULT    Code Status   Prior    Time Spent on this Encounter   I, ALAN OVALLES MD, personally saw the patient today and spent greater than 30 minutes discharging this patient.       ALAN OVALLES MD  [address] [address]  Phone: [phone]  Fax: [phone]  ______________________________________________________________________    Physical Exam   Vital Signs: Temp: 97.9  F (36.6  C) Temp src: Oral BP: (!) 159/77 (Nurse notified) Pulse: 108   Resp: 18 SpO2: 94 % O2 Device: None (Room air)    Weight: 145 lbs 4.53 oz    General Appearance:  NAD, well appearing  Respiratory: Normal effort  Cardiovascular: RRR. Normal S1/S2  GI: Soft. NT/ND  Skin: No rashes on exposed skin  Other:  No peripheral edema       Primary Care Physician   Vani Berkowitz    Discharge Orders      Home Care Referral      Reason for your hospital stay    You were admitted for an episode of unresponsiveness the day of arrival and you were also evalauted for your recent spells where you feel like you might faint. Your brain and heart imaging here look good and your heart rhythm monitoring did not show a reason for your symptoms. It is hard to say why you are having the recurring spells or if the unresponsive episode was related or not. The neurologist saw you and felt the most recent unresponsive episode was not a seizure. We placed a  cardiac monitor on you that should be worn for 2 weeks and then returned to be evaluated, which your primary care doctor will help you evaluate when the results come back.     Follow-up and recommended labs and tests     Follow up with primary care provider, Vani Berkowitz, at 14 days for hospital follow- up.  The following labs/tests are recommended: results review of cardiac monitor     Activity    Your activity upon discharge: activity as tolerated     Adult Cardiac Event Monitor     Diet    Follow this diet upon discharge: Orders Placed This Encounter      Regular Diet Adult       Significant Results and Procedures   Most Recent 3 CBC's:  Recent Labs   Lab Test 10/09/23  0538 10/08/23  2139 05/17/21  0617 05/15/21  0543 05/14/21  1648   WBC 6.3 7.8  --   --  9.8   HGB 12.4 13.6 12.2   < > 14.5   MCV 89 89  --   --  90    327  --   --  292    < > = values in this interval not displayed.     Most Recent 3 BMP's:  Recent Labs   Lab Test 10/10/23  0707 10/09/23  0604 10/09/23  0538 10/08/23  2139   *  --  132* 131*   POTASSIUM 4.8  --  3.9 4.1   CHLORIDE 102  --  101 97*   CO2 25  --  21* 24   BUN 12.4  --  11.5 13.5   CR 0.81  --  0.62 0.75   ANIONGAP 6*  --  10 10   NALDO 8.6*  --  8.6* 9.6   GLC 92 118* 109* 147*   ,   Results for orders placed or performed during the hospital encounter of 10/08/23   Cervical spine CT w/o contrast    Narrative    EXAM: CT CERVICAL SPINE W/O CONTRAST  LOCATION: [facility]  DATE: 10/8/2023    INDICATION: syncope posterior neck pain  COMPARISON: None.  TECHNIQUE: Routine CT Cervical Spine without IV contrast. Multiplanar reformats. Dose reduction techniques were used.    FINDINGS:  VERTEBRA: Exaggerated cervical lordosis. Vertebral body height is normal. Slight retrolisthesis of C4 and C5. No fracture or posttraumatic subluxation.     CANAL/FORAMINA: No central canal stenosis. Moderate left foraminal stenosis at C4-C5. Severe left foraminal  stenosis at C5-C6.    PARASPINAL: 1.5 cm nodule right thyroid gland.      Impression    IMPRESSION:  1.  No fracture or posttraumatic subluxation.  2.  Severe left foraminal stenosis at C5-C6.  3.  Moderate left foraminal stenosis at C4-C5.   CTA Head Neck with Contrast    Narrative    EXAM: CTA HEAD NECK W CONTRAST  LOCATION: [facility]  DATE: 10/8/2023    INDICATION: SYNCOPE HEADACHE NECK PAIN  COMPARISON: None.  CONTRAST: isovue 370 100ml  TECHNIQUE: Head and neck CT angiogram with IV contrast. Noncontrast head CT followed by axial helical CT images of the head and neck vessels obtained during the arterial phase of intravenous contrast administration. Axial 2D reconstructed images and   multiplanar 3D MIP reconstructed images of the head and neck vessels were performed by the technologist. Dose reduction techniques were used. All stenosis measurements made according to NASCET criteria unless otherwise specified.    FINDINGS:   NONCONTRAST HEAD CT:   INTRACRANIAL CONTENTS: No intracranial hemorrhage, extraaxial collection, or mass effect.  No CT evidence of acute infarct. Mild presumed chronic small vessel ischemic changes. Mild generalized volume loss. No hydrocephalus. Chronic lacunar infarcts left   thalamus and right basal ganglia.     VISUALIZED ORBITS/SINUSES/MASTOIDS: No intraorbital abnormality. No paranasal sinus mucosal disease. No middle ear or mastoid effusion.    BONES/SOFT TISSUES: No acute abnormality.    HEAD CTA:  ANTERIOR CIRCULATION: Relatively severe stenosis at the supraclinoid right ICA. No branch occlusion, aneurysm or AVM. Small right A1 segment.    POSTERIOR CIRCULATION: No stenosis/occlusion, aneurysm, or high flow vascular malformation. Balanced vertebral arteries supply a normal basilar artery.     DURAL VENOUS SINUSES: Not well evaluated on a technical basis.    NECK CTA:  RIGHT CAROTID: No measurable stenosis or dissection.    LEFT CAROTID: No measurable  stenosis or dissection.    VERTEBRAL ARTERIES: No focal stenosis or dissection. Balanced vertebral arteries.    AORTIC ARCH: Vascular variant aortic arch origin left vertebral artery.  No significant stenosis at the origin of the great vessels.    NONVASCULAR STRUCTURES: Unremarkable.      Impression    IMPRESSION:   HEAD CT:  No acute intracranial process.    HEAD CTA:  1.  No branch occlusion, aneurysm or AV malformation.  2.  Relatively severe stenosis at the supraclinoid right ICA.    NECK CTA:  No measurable stenosis or dissection.     CTA Chest Abdomen Pelvis w Contrast    Narrative    EXAM: CTA CHEST ABDOMEN PELVIS W CONTRAST  LOCATION: [facility]  DATE: 10/8/2023    INDICATION: syncope left flank pain  COMPARISON: None.  TECHNIQUE: CT angiogram chest abdomen pelvis during arterial phase of injection of IV contrast. 2D and 3D MIP reconstructions were performed by the CT technologist. Dose reduction techniques were used.   CONTRAST: isovue 370 100ml    FINDINGS:   CT ANGIOGRAM CHEST, ABDOMEN, AND PELVIS: No hyperdense acute aortic intramural hematoma on the noncontrast series. Following the administration of IV contrast, the ascending thoracic aorta is ectatic, measuring 3.8 cm in diameter. The remainder of the   thoracoabdominal aorta is of normal caliber. There is diffuse atherosclerotic change but no dissection. Four-vessel aortic arch with patent arch vessels. The abdominal aortic branch vessels are patent. The iliac and proximal femoral arteries are patent.   There is no central pulmonary embolism.    LUNGS AND PLEURA: Bibasilar atelectasis, left greater than right, with passive atelectasis along the elevated left hemidiaphragm. No acute airspace infiltrate. No pneumothorax or pleural effusion.    MEDIASTINUM/AXILLAE: Heart size within normal limits. No pericardial effusion. Hypodense right thyroid lobe nodule measuring 1.5 cm can be evaluated further with ultrasound on an  outpatient basis if clinically warranted.    CORONARY ARTERY CALCIFICATION: Mild to moderate    HEPATOBILIARY: Absent gallbladder. Liver within normal limits.    PANCREAS: Normal.    SPLEEN: Normal.    ADRENAL GLANDS: Normal right adrenal. Multiple small left adrenal nodules measuring up to 12 mm which are technically indeterminant.    KIDNEYS/BLADDER: Benign left midpole cortical cyst. Mild cortical thinning, left greater than right. No hydronephrosis. Nondistended bladder.    BOWEL: Distended stomach with air-fluid level in the high left upper quadrant. Compression of the distal stomach near the antropyloric junction in the high left upper quadrant adjacent to stool filled loops of redundant colon as seen on image 102 of   series 4. There is stool throughout the colon, with a large amount of semisolid stool in the rectum which is distended to approximately 8 x 9 cm in diameter. No free air.    LYMPH NODES: Subcentimeter retroperitoneal lymph nodes.    PELVIC ORGANS: The uterus is atrophic or absent.    MUSCULOSKELETAL: Left hip arthroplasty in place. Severe osteopenia.      Impression    IMPRESSION:  1.  Negative for aortic dissection.    2.  Stool present throughout the colon, with large amount of semisolid stool in the rectum.    3.  Elevated left hemidiaphragm with distended stomach in the high left upper quadrant. Loops of stool-filled, redundant large bowel in the region of the distal stomach may result in a functional gastric outlet obstruction. Consider nasogastric   decompression.     XR Knee Right 1/2 Views    Narrative    EXAM: XR KNEE RIGHT 1/2 VIEWS  LOCATION: [facility]  DATE: 10/9/2023    INDICATION: knee pain  COMPARISON: None.      Impression    IMPRESSION: There is no acute fracture or dislocation. 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 Joint body in the suprapatellar pouch. Joint effusion.   MR Brain w/o Contrast    Narrative    EXAM: MR BRAIN W/O  CONTRAST  LOCATION: [facility]  DATE: 10/9/2023    INDICATION: Syncope. Headache. Neck pain.  COMPARISON: CT angiogram head and neck 10/08/2023.  TECHNIQUE: Routine multiplanar multisequence head MRI without intravenous contrast.    FINDINGS:  INTRACRANIAL CONTENTS: No acute or subacute infarct. Chronic lacunar infarct in the left thalamus. Chronic lacunar type infarcts in the right cerebellar hemisphere. There are a few foci of decreased signal on gradient echo imaging in the bilateral   cerebral hemispheres. No mass, acute hemorrhage, or extra-axial fluid collections. Scattered nonspecific T2/FLAIR hyperintensities within the cerebral white matter and yoly most consistent with mild chronic microvascular ischemic change. Mild to moderate   generalized cerebral atrophy. No hydrocephalus. Mild to moderate cerebellar atrophy.     SELLA: No abnormality accounting for technique.    OSSEOUS STRUCTURES/SOFT TISSUES: Normal marrow signal. The major intracranial vascular flow voids are maintained.     ORBITS: No abnormality accounting for technique.     SINUSES/MASTOIDS: No paranasal sinus mucosal disease. No middle ear or mastoid effusion.       Impression    IMPRESSION:  1.  No acute infarct, intracranial hemorrhage, or intracranial mass.  2.  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  3.  Generalized brain atrophy and presumed microvascular ischemic changes as detailed above.   Echocardiogram Complete     Value    LVEF  > 65%    [facility]    [id number]  [id number]  [id number]  [id number]^KATIA^BRYAN^[place] [address]     Name: ALIZE BHAGAT  MRN: [id number]  : 1940  Study Date: 10/09/2023 08:10 AM  Age: 83 yrs  Gender: Female  Patient [facility]  Reason For Study: Syncope, Stress Syncope and Collapse  Ordering Physician: BRYAN MONTALVO  Performed By: [facility]     BSA: 1.8 m2  Height: 70 in  Weight: 140  lb  BP: 145/71 mmHg  ______________________________________________________________________________  Procedure  Complete Portable Echo Adult. Definity (NDC #38567-101) given intravenously.  2.0ml lot # [id number]. Technically difficult study.Extremely difficult acoustic  windows despite the use of contrast for endcardial border definition. No  hemodynamically significant valvular abnormalities on 2D or color flow  imaging. There is no comparison study available.  ______________________________________________________________________________  Interpretation Summary     Technically difficult study.Extremely difficult acoustic windows despite the  use of contrast for endcardial border definition.  Left ventricular function is normal.The ejection fraction is > 65%. Left  ventricular diastolic function is abnormal.  Normal right ventricle size and systolic function.  With limited visualization, no hemodynamically significant valvular  abnormalities on 2D or color flow imaging.  There is no comparison study available.  ______________________________________________________________________________  Left Ventricle  The left ventricle is normal in size. Left ventricular function is normal.The  ejection fraction is > 65%. Left ventricular diastolic function is abnormal.  No regional wall motion abnormalities noted.     Right Ventricle  Normal right ventricle size and systolic function.     Atria  Normal left atrial size. Right atrial size is normal. There is no color  Doppler evidence of an atrial shunt.     Mitral Valve  Mitral valve leaflets appear normal. There is no evidence of mitral stenosis  or clinically significant mitral regurgitation.     Tricuspid Valve  Tricuspid valve leaflets appear normal. There is no evidence of tricuspid  stenosis or clinically significant tricuspid regurgitation. Right ventricular  systolic pressure could not be approximated due to inadequate tricuspid  regurgitation.     Aortic Valve  The aortic  valve is trileaflet. Aortic valve leaflets appear normal. There is  no evidence of aortic stenosis or clinically significant aortic regurgitation.     Pulmonic Valve  The pulmonic valve is not well visualized. This degree of valvular  regurgitation is within normal limits. There is trace pulmonic valvular  regurgitation.     Vessels  The aorta root is normal. Normal size ascending aorta. IVC diameter <2.1 cm  collapsing >50% with sniff suggests a normal RA pressure of 3 mmHg.     Pericardium  There is no pericardial effusion.     Rhythm  Sinus rhythm was noted.  ______________________________________________________________________________  MMode/2D Measurements & Calculations  LVIDd: 3.7 cm  LVIDs: 2.6 cm  FS: 30.9 %  Ao root diam: 2.7 cm  LVOT diam: 2.1 cm  LVOT area: 3.5 cm2  Ao root diam index Ht(cm/m): 1.5  Ao root diam index BSA (cm/m2): 1.5  LA Volume (BP): 30.0 ml  LA Volume Index (BP): 16.8 ml/m2     LA Volume Indexed (AL/bp): 17.8 ml/m2  TAPSE: 2.1 cm     Doppler Measurements & Calculations  MV E max shon: 80.2 cm/sec  MV A max shon: 133.0 cm/sec  MV E/A: 0.60  MV max P.9 mmHg  MV mean PG: 3.0 mmHg  MV V2 VTI: 34.0 cm  MVA(VTI): 2.4 cm2  MV dec slope: 177.0 cm/sec2  MV dec time: 0.45 sec  Ao V2 max: 137.0 cm/sec  Ao max P.0 mmHg  Ao V2 mean: 101.0 cm/sec  Ao mean P.0 mmHg  Ao V2 VTI: 29.0 cm  CARRI(I,D): 2.8 cm2  CARRI(V,D): 3.1 cm2  LV V1 max P.9 mmHg  LV V1 max: 121.0 cm/sec  LV V1 VTI: 23.8 cm  SV(LVOT): 82.4 ml  SI(LVOT): 46.0 ml/m2     AV Shon Ratio (DI): 0.88  CARRI Index (cm2/m2): 1.6  E/E' av.0  Lateral E/e': 11.9  Medial E/e': 16.0  RV S Shon: 10.9 cm/sec     ______________________________________________________________________________  Report approved by: Swapna Palma 10/09/2023 11:13 AM             Discharge Medications   Discharge Medication List as of 10/10/2023  1:46 PM        CONTINUE these medications which have NOT CHANGED    Details   cholecalciferol, vitamin D3, 1,000  unit (25 mcg) tablet [CHOLECALCIFEROL, VITAMIN D3, 1,000 UNIT (25 MCG) TABLET] Take 1,000 Units by mouth daily., Historical      ibuprofen (ADVIL,MOTRIN) 200 MG tablet [IBUPROFEN (ADVIL,MOTRIN) 200 MG TABLET] Take 400 mg by mouth every 8 (eight) hours as needed for pain. , Historical      lisinopril (ZESTRIL) 40 MG tablet Take 40 mg by mouth daily, Historical      loratadine-pseudoephedrine (LORATADINE-PSEUDOEPHEDRINE) 5-120 mg Tb12 Take 1 tablet by mouth daily as needed for allergies, Historical      omeprazole (PRILOSEC) 20 MG DR capsule Take 20 mg by mouth daily as needed (acid reflux), Historical           Allergies   No Known Allergies

## 2023-10-10 NOTE — PLAN OF CARE
Problem: Plan of Care - These are the overarching goals to be used throughout the patient stay.    Goal: Plan of Care Review  Description: The Plan of Care Review/Shift note should be completed every shift.  The Outcome Evaluation is a brief statement about your assessment that the patient is improving, declining, or no change.  This information will be displayed automatically on your shift note.  Outcome: Progressing     Problem: Plan of Care - These are the overarching goals to be used throughout the patient stay.    Goal: Optimal Comfort and Wellbeing  Outcome: Progressing   Goal Outcome Evaluation:       Pt denied pain throughout shift. Slept intermittently. Neuro checks were WNL. Used PureWick this shift which was effective.

## 2023-10-10 NOTE — UTILIZATION REVIEW
"Admission Status; Secondary Review Determination     Under the authority of the Utilization Management Committee, the utilization review process indicated a secondary review on Alisson Gooden.  The review outcome is based on review of the medical records, discussions with staff, and applying clinical experience noted on the date of the review.     (x) Observation Status Appropriate - This patient does not meet hospital inpatient criteria and is placed in observation status. If this patient's primary payer is Medicare and was admitted as an inpatient, Condition Code 44 should be used and patient status changed to \"observation\".     RATIONALE FOR DETERMINATION   Alisson Gooden is an 83 yr old female with hx traumatic subdural hematoma s/p craniotomy with clot evacuation, HTN, chronic pain who presented with intermittent change in mental status for several hours over past weekend.  IVF given.  Neurology suspects seizure unlikely. Imaging negative and telemetry and echo ok.  Self limited abdominal pain while present.  Discharging after one night.    The severity of illness, intensity of service provided, expected LOS and risk for adverse outcome make the care appropriate for further observation; however, doesn't meet criteria for hospital inpatient admission. Dr Roberts notified of this determination and agrees with downgrade of status.      The information on this document is developed by the utilization review team in order for the business office to ensure compliance.  This only denotes the appropriateness of proper admission status and does not reflect the quality of care rendered.         The definitions of Inpatient Status and Observation Status used in making the determination above are those provided in the CMS Coverage Manual, Chapter 1 and Chapter 6, section 70.4.      Sincerely,  Dianne Serrato MD  Utilization Review  Physician Advisor  [facility]    "

## 2023-10-10 NOTE — PLAN OF CARE
Physical Therapy Discharge Summary    Reason for therapy discharge:    Discharged to home with home therapy.    Progress towards therapy goal(s). See goals on Care Plan in [facility] electronic health record for goal details.  Goals partially met.  Barriers to achieving goals:   discharge from facility.    Therapy recommendation(s):    Continued therapy is recommended.  Rationale/Recommendations:  Continue with family assist and home PT as pt is progressing towards goals.

## 2023-10-10 NOTE — PLAN OF CARE
Problem: Plan of Care - These are the overarching goals to be used throughout the patient stay.    Goal: Absence of Hospital-Acquired Illness or Injury  Intervention: Identify and Manage Fall Risk  Recent Flowsheet Documentation  Taken 10/10/2023 4771 by Florecita Ballesteros, RN  Safety Promotion/Fall Prevention: activity supervised   Goal Outcome Evaluation:         Patient alert and oriented. Denies having any pain, dizziness or shortness of breath. NSR on tele. Participated in physical therapy. She discharged at 1405, escorted home by her daughter.

## 2023-10-10 NOTE — PROGRESS NOTES
Care Management Discharge Note    Discharge Date: 10/10/2023       Discharge Disposition: Home Care    Discharge Services: None    Discharge DME: None    Discharge Transportation: family or friend will provide    Private pay costs discussed: Not applicable    Does the patient's insurance plan have a 3 day qualifying hospital stay waiver?  No    PAS Confirmation Code:  NA  Patient/family educated on Medicare website which has current facility and service quality ratings:  NA    Education Provided on the Discharge Plan: Yes  Persons Notified of Discharge Plans: per care team  Patient/Family in Agreement with the Plan: yes    Handoff Referral Completed: Yes    Additional Information:  Patient discharging home with spouse.       Accepted to [facility] for skilled nursing and physical therapy.     Daughter Shayy will transport.    No additional CM needs identified.      Julia Maguire RN

## 2023-10-14 ENCOUNTER — HEALTH MAINTENANCE LETTER (OUTPATIENT)
Age: 83
End: 2023-10-14

## 2023-10-17 ENCOUNTER — TRANSFERRED RECORDS (OUTPATIENT)
Dept: HEALTH INFORMATION MANAGEMENT | Facility: CLINIC | Age: 83
End: 2023-10-17

## 2023-11-10 ENCOUNTER — LAB REQUISITION (OUTPATIENT)
Dept: LAB | Facility: CLINIC | Age: 83
End: 2023-11-10
Payer: MEDICARE

## 2023-11-10 DIAGNOSIS — R41.3 OTHER AMNESIA: ICD-10-CM

## 2023-11-10 PROCEDURE — 82607 VITAMIN B-12: CPT | Mod: ORL | Performed by: FAMILY MEDICINE

## 2023-11-10 PROCEDURE — 84443 ASSAY THYROID STIM HORMONE: CPT | Mod: ORL | Performed by: FAMILY MEDICINE

## 2023-11-11 LAB
TSH SERPL DL<=0.005 MIU/L-ACNC: 0.9 UIU/ML (ref 0.3–4.2)
VIT B12 SERPL-MCNC: 282 PG/ML (ref 232–1245)

## 2023-11-13 ENCOUNTER — TRANSFERRED RECORDS (OUTPATIENT)
Dept: HEALTH INFORMATION MANAGEMENT | Facility: CLINIC | Age: 83
End: 2023-11-13
Payer: MEDICARE

## 2023-11-22 ENCOUNTER — MEDICAL CORRESPONDENCE (OUTPATIENT)
Dept: HEALTH INFORMATION MANAGEMENT | Facility: CLINIC | Age: 83
End: 2023-11-22
Payer: MEDICARE

## 2023-12-21 ENCOUNTER — OFFICE VISIT (OUTPATIENT)
Dept: CARDIOLOGY | Facility: CLINIC | Age: 83
End: 2023-12-21
Payer: MEDICARE

## 2023-12-21 VITALS
BODY MASS INDEX: 20.9 KG/M2 | HEART RATE: 84 BPM | RESPIRATION RATE: 16 BRPM | WEIGHT: 146 LBS | DIASTOLIC BLOOD PRESSURE: 84 MMHG | HEIGHT: 70 IN | SYSTOLIC BLOOD PRESSURE: 148 MMHG

## 2023-12-21 DIAGNOSIS — R55 SYNCOPE, UNSPECIFIED SYNCOPE TYPE: Primary | ICD-10-CM

## 2023-12-21 PROCEDURE — 99204 OFFICE O/P NEW MOD 45 MIN: CPT | Performed by: INTERNAL MEDICINE

## 2023-12-21 NOTE — PATIENT INSTRUCTIONS
[facility]  Cardiac Electrophysiology  [address]  [place] [address]   Office: [phone]  Fax: [phone]       Thank you for seeing us in clinic today - it is a pleasure to be a part of your care team.  Below is a summary of our plan from today's visit.      Think about a loop recorder and let us know how you'd like to proceed.    Please do not hesitate to be in touch with our office at [phone] with any questions that may arise.      Thank you for trusting us with your care,    Ranjan Jameson MD  Clinical Cardiac Electrophysiology  [facility]  [address]  [place] [address]   Office: [phone]  Fax: [phone]

## 2023-12-21 NOTE — PROGRESS NOTES
HEART CARE ENCOUNTER CONSULTATON NOTE      [facility]  [phone]      Assessment/Recommendations   Assessment/Plan:    Alisson Gooden is a very pleasant 83 year old female with past medical history of hypertension, subarachnoid hemorrhage s/p surgery, chronic hyponatremia who presents today to the EP clinic.    Syncope  - 4 episodes over several years  - 2 week Zio patch did not show any sustained arrhythmias  - Discussed a loop recorder implant, she will think about it and let us know  - Recommended against driving given her episodes of syncope till a diagnosis can be made    Time spent: 45 minutes spent on the date of the encounter doing chart review, history and exam, documentation and further activities as noted above.         History of Present Illness/Subjective    HPI: Alisson Gooden is a very pleasant 83 year old female with past medical history of hypertension, subarachnoid hemorrhage s/p surgery, chronic hyponatremia who presents today to the EP clinic.    She was seen in October 2023 in the hospital for an episode of syncope. She was sitting at the table and suddenly slumped over with her eye open and became unresponsive for about 10 minutes. No seizure like activity. She woke up in the ambulance and was completely back to normal. No loss of bowel or bladder control. She has had 3 episodes in the past which were very similar. No premonition prior to any episodes.    A 14-day Zio patch was ordered which showed more than 50 short runs of SVT the longest lasting up to 15 to 20 seconds approximately.  No significant pauses were noted. No sustained arrhythmias.    Recent Echocardiogram Results (personally reviewed):    Technically difficult study.Extremely difficult acoustic windows despite the  use of contrast for endcardial border definition.  Left ventricular function is normal.The ejection fraction is > 65%. Left  ventricular diastolic function is abnormal.  Normal right ventricle  "size and systolic function.  With limited visualization, no hemodynamically significant valvular  abnormalities on 2D or color flow imaging.  There is no comparison study available.      Labs below reviewed personally     Physical Examination  Review of Systems   Vitals: BP (!) 148/84 (BP Location: Left arm, Patient Position: Sitting, Cuff Size: Adult Regular)   Pulse 84   Resp 16   Ht 1.778 m (5' 10\")   Wt 66.2 kg (146 lb)   BMI 20.95 kg/m    BMI= Body mass index is 20.95 kg/m .  Wt Readings from Last 3 Encounters:   12/21/23 66.2 kg (146 lb)   10/09/23 65.9 kg (145 lb 4.5 oz)   05/14/21 76.7 kg (169 lb)       General Appearance:   no distress, normal body habitus   ENT/Mouth: membranes moist, no oral lesions or bleeding gums.      EYES:  no scleral icterus, normal conjunctivae   Neck: no carotid bruits or thyromegaly   Chest/Lungs:   lungs are clear to auscultation, no rales or wheezing, no sternal scar, equal chest wall expansion    Cardiovascular:   Regular. Normal first and second heart sounds with no murmurs, rubs, or gallops; the carotid, radial and posterior tibial pulses are intact, no edema bilaterally    Abdomen:  no organomegaly, masses, bruits, or tenderness; bowel sounds are present   Extremities: no cyanosis or clubbing   Skin: no xanthelasma, warm.    Neurologic: normal  bilateral, no tremors     Psychiatric: alert and oriented x3, calm        Please refer above for cardiac ROS details.        Medical History  Surgical History Family History Social History   No past medical history on file.  Past Surgical History:   Procedure Laterality Date    APPENDECTOMY      CHOLECYSTECTOMY      ECTOPIC PREGNANCY SURGERY      ZZC PARTIAL HIP REPLACEMENT Left 5/15/2021    Procedure: LEFT HEMIARTHROPLASTY, HIP, BIPOLAR;  Surgeon: Jermaine Greenfield MD;  Location: [facility] OR;  Service: Orthopedics     Family History   Problem Relation Age of Onset    Coronary Artery Disease Mother     Gallbladder " Disease Father     Coronary Artery Disease Brother         Social History     Socioeconomic History    Marital status:      Spouse name: Not on file    Number of children: Not on file    Years of education: Not on file    Highest education level: Not on file   Occupational History    Not on file   Tobacco Use    Smoking status: Never    Smokeless tobacco: Never   Substance and Sexual Activity    Alcohol use: Not Currently    Drug use: Not on file    Sexual activity: Not on file   Other Topics Concern    Not on file   Social History Narrative    Not on file     Social Determinants of Health     Financial Resource Strain: Not on file   Food Insecurity: Not on file   Transportation Needs: Not on file   Physical Activity: Not on file   Stress: Not on file   Social Connections: Not on file   Interpersonal Safety: Not on file   Housing Stability: Not on file           Medications  Allergies   Current Outpatient Medications   Medication Sig Dispense Refill    ibuprofen (ADVIL,MOTRIN) 200 MG tablet [IBUPROFEN (ADVIL,MOTRIN) 200 MG TABLET] Take 400 mg by mouth every 8 (eight) hours as needed for pain.       lisinopril (ZESTRIL) 40 MG tablet Take 40 mg by mouth daily      cholecalciferol, vitamin D3, 1,000 unit (25 mcg) tablet [CHOLECALCIFEROL, VITAMIN D3, 1,000 UNIT (25 MCG) TABLET] Take 1,000 Units by mouth daily. (Patient not taking: Reported on 12/21/2023)      loratadine-pseudoephedrine (LORATADINE-PSEUDOEPHEDRINE) 5-120 mg Tb12 Take 1 tablet by mouth daily as needed for allergies (Patient not taking: Reported on 12/21/2023)      omeprazole (PRILOSEC) 20 MG DR capsule Take 20 mg by mouth daily as needed (acid reflux) (Patient not taking: Reported on 12/21/2023)         Allergies   Allergen Reactions    Acetaminophen      Other Reaction(s): *Unknown    Codeine      Other Reaction(s): Unknown    Oxycodone Nausea    Pravastatin      Other Reaction(s): muscle pain    Simvastatin      Other Reaction(s): achiness     "Sulfa Antibiotics           Lab Results    Chemistry/lipid CBC Cardiac Enzymes/BNP/TSH/INR   Recent Labs   Lab Test 10/06/22  1624   CHOL 208*   HDL 57   *   TRIG 113     Recent Labs   Lab Test 10/06/22  1624 03/17/21  1439 02/06/20  1416   * 134* 140*     Recent Labs   Lab Test 10/10/23  0707   *   POTASSIUM 4.8   CHLORIDE 102   CO2 25   GLC 92   BUN 12.4   CR 0.81   GFRESTIMATED 71   NALDO 8.6*     Recent Labs   Lab Test 10/10/23  0707 10/09/23  0538 10/08/23  2139   CR 0.81 0.62 0.75     No results for input(s): \"A1C\" in the last 32194 hours.       Recent Labs   Lab Test 10/09/23  0538   WBC 6.3   HGB 12.4   HCT 37.1   MCV 89        Recent Labs   Lab Test 10/09/23  0538 10/08/23  2139 05/17/21  0617   HGB 12.4 13.6 12.2    No results for input(s): \"TROPONINI\" in the last 73863 hours.  Recent Labs   Lab Test 10/08/23  2321   NTBNPI 86     Recent Labs   Lab Test 11/10/23  1610   TSH 0.90     Recent Labs   Lab Test 05/18/21  0547 05/17/21  0617 05/16/21  0657   INR 1.17* 1.19* 1.14*        Ranjan Jameson MD                                      "

## 2023-12-21 NOTE — H&P (VIEW-ONLY)
HEART CARE ENCOUNTER CONSULTATON NOTE      [facility]  [phone]      Assessment/Recommendations   Assessment/Plan:    Alisson Gooden is a very pleasant 83 year old female with past medical history of hypertension, subarachnoid hemorrhage s/p surgery, chronic hyponatremia who presents today to the EP clinic.    Syncope  - 4 episodes over several years  - 2 week Zio patch did not show any sustained arrhythmias  - Discussed a loop recorder implant, she will think about it and let us know  - Recommended against driving given her episodes of syncope till a diagnosis can be made    Time spent: 45 minutes spent on the date of the encounter doing chart review, history and exam, documentation and further activities as noted above.         History of Present Illness/Subjective    HPI: Alisson Gooden is a very pleasant 83 year old female with past medical history of hypertension, subarachnoid hemorrhage s/p surgery, chronic hyponatremia who presents today to the EP clinic.    She was seen in October 2023 in the hospital for an episode of syncope. She was sitting at the table and suddenly slumped over with her eye open and became unresponsive for about 10 minutes. No seizure like activity. She woke up in the ambulance and was completely back to normal. No loss of bowel or bladder control. She has had 3 episodes in the past which were very similar. No premonition prior to any episodes.    A 14-day Zio patch was ordered which showed more than 50 short runs of SVT the longest lasting up to 15 to 20 seconds approximately.  No significant pauses were noted. No sustained arrhythmias.    Recent Echocardiogram Results (personally reviewed):    Technically difficult study.Extremely difficult acoustic windows despite the  use of contrast for endcardial border definition.  Left ventricular function is normal.The ejection fraction is > 65%. Left  ventricular diastolic function is abnormal.  Normal right ventricle  "size and systolic function.  With limited visualization, no hemodynamically significant valvular  abnormalities on 2D or color flow imaging.  There is no comparison study available.      Labs below reviewed personally     Physical Examination  Review of Systems   Vitals: BP (!) 148/84 (BP Location: Left arm, Patient Position: Sitting, Cuff Size: Adult Regular)   Pulse 84   Resp 16   Ht 1.778 m (5' 10\")   Wt 66.2 kg (146 lb)   BMI 20.95 kg/m    BMI= Body mass index is 20.95 kg/m .  Wt Readings from Last 3 Encounters:   12/21/23 66.2 kg (146 lb)   10/09/23 65.9 kg (145 lb 4.5 oz)   05/14/21 76.7 kg (169 lb)       General Appearance:   no distress, normal body habitus   ENT/Mouth: membranes moist, no oral lesions or bleeding gums.      EYES:  no scleral icterus, normal conjunctivae   Neck: no carotid bruits or thyromegaly   Chest/Lungs:   lungs are clear to auscultation, no rales or wheezing, no sternal scar, equal chest wall expansion    Cardiovascular:   Regular. Normal first and second heart sounds with no murmurs, rubs, or gallops; the carotid, radial and posterior tibial pulses are intact, no edema bilaterally    Abdomen:  no organomegaly, masses, bruits, or tenderness; bowel sounds are present   Extremities: no cyanosis or clubbing   Skin: no xanthelasma, warm.    Neurologic: normal  bilateral, no tremors     Psychiatric: alert and oriented x3, calm        Please refer above for cardiac ROS details.        Medical History  Surgical History Family History Social History   No past medical history on file.  Past Surgical History:   Procedure Laterality Date    APPENDECTOMY      CHOLECYSTECTOMY      ECTOPIC PREGNANCY SURGERY      ZZC PARTIAL HIP REPLACEMENT Left 5/15/2021    Procedure: LEFT HEMIARTHROPLASTY, HIP, BIPOLAR;  Surgeon: Jermaine Greenfield MD;  Location: [facility] OR;  Service: Orthopedics     Family History   Problem Relation Age of Onset    Coronary Artery Disease Mother     Gallbladder " Disease Father     Coronary Artery Disease Brother         Social History     Socioeconomic History    Marital status:      Spouse name: Not on file    Number of children: Not on file    Years of education: Not on file    Highest education level: Not on file   Occupational History    Not on file   Tobacco Use    Smoking status: Never    Smokeless tobacco: Never   Substance and Sexual Activity    Alcohol use: Not Currently    Drug use: Not on file    Sexual activity: Not on file   Other Topics Concern    Not on file   Social History Narrative    Not on file     Social Determinants of Health     Financial Resource Strain: Not on file   Food Insecurity: Not on file   Transportation Needs: Not on file   Physical Activity: Not on file   Stress: Not on file   Social Connections: Not on file   Interpersonal Safety: Not on file   Housing Stability: Not on file           Medications  Allergies   Current Outpatient Medications   Medication Sig Dispense Refill    ibuprofen (ADVIL,MOTRIN) 200 MG tablet [IBUPROFEN (ADVIL,MOTRIN) 200 MG TABLET] Take 400 mg by mouth every 8 (eight) hours as needed for pain.       lisinopril (ZESTRIL) 40 MG tablet Take 40 mg by mouth daily      cholecalciferol, vitamin D3, 1,000 unit (25 mcg) tablet [CHOLECALCIFEROL, VITAMIN D3, 1,000 UNIT (25 MCG) TABLET] Take 1,000 Units by mouth daily. (Patient not taking: Reported on 12/21/2023)      loratadine-pseudoephedrine (LORATADINE-PSEUDOEPHEDRINE) 5-120 mg Tb12 Take 1 tablet by mouth daily as needed for allergies (Patient not taking: Reported on 12/21/2023)      omeprazole (PRILOSEC) 20 MG DR capsule Take 20 mg by mouth daily as needed (acid reflux) (Patient not taking: Reported on 12/21/2023)         Allergies   Allergen Reactions    Acetaminophen      Other Reaction(s): *Unknown    Codeine      Other Reaction(s): Unknown    Oxycodone Nausea    Pravastatin      Other Reaction(s): muscle pain    Simvastatin      Other Reaction(s): achiness     "Sulfa Antibiotics           Lab Results    Chemistry/lipid CBC Cardiac Enzymes/BNP/TSH/INR   Recent Labs   Lab Test 10/06/22  1624   CHOL 208*   HDL 57   *   TRIG 113     Recent Labs   Lab Test 10/06/22  1624 03/17/21  1439 02/06/20  1416   * 134* 140*     Recent Labs   Lab Test 10/10/23  0707   *   POTASSIUM 4.8   CHLORIDE 102   CO2 25   GLC 92   BUN 12.4   CR 0.81   GFRESTIMATED 71   NALDO 8.6*     Recent Labs   Lab Test 10/10/23  0707 10/09/23  0538 10/08/23  2139   CR 0.81 0.62 0.75     No results for input(s): \"A1C\" in the last 89929 hours.       Recent Labs   Lab Test 10/09/23  0538   WBC 6.3   HGB 12.4   HCT 37.1   MCV 89        Recent Labs   Lab Test 10/09/23  0538 10/08/23  2139 05/17/21  0617   HGB 12.4 13.6 12.2    No results for input(s): \"TROPONINI\" in the last 58487 hours.  Recent Labs   Lab Test 10/08/23  2321   NTBNPI 86     Recent Labs   Lab Test 11/10/23  1610   TSH 0.90     Recent Labs   Lab Test 05/18/21  0547 05/17/21  0617 05/16/21  0657   INR 1.17* 1.19* 1.14*        Ranjan Jameson MD                                      "

## 2023-12-21 NOTE — LETTER
12/21/2023    Vani Berkowitz MD  [address]  [facility] [address]    RE: Alisson Gooden       Dear Colleague,     I had the pleasure of seeing Alisson Gooden in the [facility].    HEART CARE ENCOUNTER CONSULTATON NOTE      [facility]  [phone]      Assessment/Recommendations   Assessment/Plan:    Alisson Gooden is a very pleasant 83 year old female with past medical history of hypertension, subarachnoid hemorrhage s/p surgery, chronic hyponatremia who presents today to the EP clinic.    Syncope  - 4 episodes over several years  - 2 week Zio patch did not show any sustained arrhythmias  - Discussed a loop recorder implant, she will think about it and let us know  - Recommended against driving given her episodes of syncope till a diagnosis can be made    Time spent: 45 minutes spent on the date of the encounter doing chart review, history and exam, documentation and further activities as noted above.         History of Present Illness/Subjective    HPI: Alisson Gooden is a very pleasant 83 year old female with past medical history of hypertension, subarachnoid hemorrhage s/p surgery, chronic hyponatremia who presents today to the EP clinic.    She was seen in October 2023 in the hospital for an episode of syncope. She was sitting at the table and suddenly slumped over with her eye open and became unresponsive for about 10 minutes. No seizure like activity. She woke up in the ambulance and was completely back to normal. No loss of bowel or bladder control. She has had 3 episodes in the past which were very similar. No premonition prior to any episodes.    A 14-day Zio patch was ordered which showed more than 50 short runs of SVT the longest lasting up to 15 to 20 seconds approximately.  No significant pauses were noted. No sustained arrhythmias.    Recent Echocardiogram Results (personally reviewed):    Technically difficult study.Extremely difficult acoustic  "windows despite the  use of contrast for endcardial border definition.  Left ventricular function is normal.The ejection fraction is > 65%. Left  ventricular diastolic function is abnormal.  Normal right ventricle size and systolic function.  With limited visualization, no hemodynamically significant valvular  abnormalities on 2D or color flow imaging.  There is no comparison study available.      Labs below reviewed personally     Physical Examination  Review of Systems   Vitals: BP (!) 148/84 (BP Location: Left arm, Patient Position: Sitting, Cuff Size: Adult Regular)   Pulse 84   Resp 16   Ht 1.778 m (5' 10\")   Wt 66.2 kg (146 lb)   BMI 20.95 kg/m    BMI= Body mass index is 20.95 kg/m .  Wt Readings from Last 3 Encounters:   12/21/23 66.2 kg (146 lb)   10/09/23 65.9 kg (145 lb 4.5 oz)   05/14/21 76.7 kg (169 lb)       General Appearance:   no distress, normal body habitus   ENT/Mouth: membranes moist, no oral lesions or bleeding gums.      EYES:  no scleral icterus, normal conjunctivae   Neck: no carotid bruits or thyromegaly   Chest/Lungs:   lungs are clear to auscultation, no rales or wheezing, no sternal scar, equal chest wall expansion    Cardiovascular:   Regular. Normal first and second heart sounds with no murmurs, rubs, or gallops; the carotid, radial and posterior tibial pulses are intact, no edema bilaterally    Abdomen:  no organomegaly, masses, bruits, or tenderness; bowel sounds are present   Extremities: no cyanosis or clubbing   Skin: no xanthelasma, warm.    Neurologic: normal  bilateral, no tremors     Psychiatric: alert and oriented x3, calm        Please refer above for cardiac ROS details.        Medical History  Surgical History Family History Social History   No past medical history on file.  Past Surgical History:   Procedure Laterality Date    APPENDECTOMY      CHOLECYSTECTOMY      ECTOPIC PREGNANCY SURGERY      ZZC PARTIAL HIP REPLACEMENT Left 5/15/2021    Procedure: LEFT " HEMIARTHROPLASTY, HIP, BIPOLAR;  Surgeon: Jermaine Greenfield MD;  Location: [facility];  Service: Orthopedics     Family History   Problem Relation Age of Onset    Coronary Artery Disease Mother     Gallbladder Disease Father     Coronary Artery Disease Brother         Social History     Socioeconomic History    Marital status:      Spouse name: Not on file    Number of children: Not on file    Years of education: Not on file    Highest education level: Not on file   Occupational History    Not on file   Tobacco Use    Smoking status: Never    Smokeless tobacco: Never   Substance and Sexual Activity    Alcohol use: Not Currently    Drug use: Not on file    Sexual activity: Not on file   Other Topics Concern    Not on file   Social History Narrative    Not on file     Social Determinants of Health     Financial Resource Strain: Not on file   Food Insecurity: Not on file   Transportation Needs: Not on file   Physical Activity: Not on file   Stress: Not on file   Social Connections: Not on file   Interpersonal Safety: Not on file   Housing Stability: Not on file           Medications  Allergies   Current Outpatient Medications   Medication Sig Dispense Refill    ibuprofen (ADVIL,MOTRIN) 200 MG tablet [IBUPROFEN (ADVIL,MOTRIN) 200 MG TABLET] Take 400 mg by mouth every 8 (eight) hours as needed for pain.       lisinopril (ZESTRIL) 40 MG tablet Take 40 mg by mouth daily      cholecalciferol, vitamin D3, 1,000 unit (25 mcg) tablet [CHOLECALCIFEROL, VITAMIN D3, 1,000 UNIT (25 MCG) TABLET] Take 1,000 Units by mouth daily. (Patient not taking: Reported on 12/21/2023)      loratadine-pseudoephedrine (LORATADINE-PSEUDOEPHEDRINE) 5-120 mg Tb12 Take 1 tablet by mouth daily as needed for allergies (Patient not taking: Reported on 12/21/2023)      omeprazole (PRILOSEC) 20 MG DR capsule Take 20 mg by mouth daily as needed (acid reflux) (Patient not taking: Reported on 12/21/2023)         Allergies   Allergen Reactions  "   Acetaminophen      Other Reaction(s): *Unknown    Codeine      Other Reaction(s): Unknown    Oxycodone Nausea    Pravastatin      Other Reaction(s): muscle pain    Simvastatin      Other Reaction(s): achiness    Sulfa Antibiotics           Lab Results    Chemistry/lipid CBC Cardiac Enzymes/BNP/TSH/INR   Recent Labs   Lab Test 10/06/22  1624   CHOL 208*   HDL 57   *   TRIG 113     Recent Labs   Lab Test 10/06/22  1624 03/17/21  1439 02/06/20  1416   * 134* 140*     Recent Labs   Lab Test 10/10/23  0707   *   POTASSIUM 4.8   CHLORIDE 102   CO2 25   GLC 92   BUN 12.4   CR 0.81   GFRESTIMATED 71   NALDO 8.6*     Recent Labs   Lab Test 10/10/23  0707 10/09/23  0538 10/08/23  2139   CR 0.81 0.62 0.75     No results for input(s): \"A1C\" in the last 53110 hours.       Recent Labs   Lab Test 10/09/23  0538   WBC 6.3   HGB 12.4   HCT 37.1   MCV 89        Recent Labs   Lab Test 10/09/23  0538 10/08/23  2139 05/17/21  0617   HGB 12.4 13.6 12.2    No results for input(s): \"TROPONINI\" in the last 57296 hours.  Recent Labs   Lab Test 10/08/23  2321   NTBNPI 86     Recent Labs   Lab Test 11/10/23  1610   TSH 0.90     Recent Labs   Lab Test 05/18/21  0547 05/17/21  0617 05/16/21  0657   INR 1.17* 1.19* 1.14*        Ranjan Jameson MD                                        Thank you for allowing me to participate in the care of your patient.      Sincerely,     Ranjan Jameson MD     [facility]  cc:   No referring provider defined for this encounter.      " unsure

## 2024-01-04 ENCOUNTER — DOCUMENTATION ONLY (OUTPATIENT)
Dept: CARDIOLOGY | Facility: CLINIC | Age: 84
End: 2024-01-04
Payer: MEDICARE

## 2024-01-04 ENCOUNTER — PREP FOR PROCEDURE (OUTPATIENT)
Dept: CARDIOLOGY | Facility: CLINIC | Age: 84
End: 2024-01-04
Payer: MEDICARE

## 2024-01-04 ENCOUNTER — TELEPHONE (OUTPATIENT)
Dept: CARDIOLOGY | Facility: CLINIC | Age: 84
End: 2024-01-04
Payer: MEDICARE

## 2024-01-04 DIAGNOSIS — R55 SYNCOPE, UNSPECIFIED SYNCOPE TYPE: Primary | ICD-10-CM

## 2024-01-04 RX ORDER — SODIUM CHLORIDE 9 MG/ML
100 INJECTION, SOLUTION INTRAVENOUS CONTINUOUS
Status: CANCELLED | OUTPATIENT
Start: 2024-01-19

## 2024-01-04 RX ORDER — LIDOCAINE 40 MG/G
CREAM TOPICAL
Status: CANCELLED | OUTPATIENT
Start: 2024-01-04

## 2024-01-04 NOTE — PROGRESS NOTES
H&P  PMD: []  Received [] Card OV: [x]  Date: 12/21 Teach  []   Orders  I [x] P  [x]  Letter []   AC: None- NA Diuretics: None  DM Meds: None  GLP-1:None     1940  Home:[phone] (home) Cell:There is no such number on file (mobile).  Emergency Contact: Larry Gooden [phone]  PCP: Vani Berkowitz, [phone]      Important patient information for CSC/Cath Lab staff : None    [facility] EP Cath Lab Procedure Order     Device Implant/Revision:  Procedure: New Implant  Current Device/Device Co Needed for Procedure: Single Loop  BSCI  Ordering Provider: Dr Jameson  Date Ordered and Prepped: 1/4/2024 India Bowling RN  Diagnosis:  Syncope  Scheduling Timeframe:  Next Available  Scheduling Restrictions: None  Scheduling Contact: Please contact pt to schedule, if you are unable to schedule date within the next 24 hours please contact pt to update on scheduling process  Cardiology Follow Up Apt s/p:  Standard Device follow up General Card @ 6mo (does not include New CRT/HIS)  Pre-Procedural Testing needed: None  Anesthesia:  None    [facility] EP Cath Lab Prep   H&P:  Compled by cardiology on 12/21 if scheduled within 30 days, pt to schedule with PMD if procedure outside of this timeframe  Pre-Procedure Labs/T&S: For SICD & MICRA Devices only schedule lab visit at [facility] lab within 3 days prior to procedure for T&S, BMP, CBC, HcG is appropriate, and INR if on warfarin. All other Devices pre-procedure lab work will be done the morning of the procedure.  Medical Records Pertinent for Procedure:  ACT/Holter 2 week Zio patch showed no sustained arrythmias  Iodinated Contrast Dye Allergies (Does not include Shellfish, Egg, and/or Iodine Allergy)- excludes ILR/SICD:None  Renal Protocol (GFR < 40ml/min, IV contrast past 2 days, EF < or = 25%)- excludes ILR/SICD: No  GLP-1 Protocol: If on Dulaglutide (Trulicity) (weekly)- Injection hold 7 days prior to procedure  , Exenatide extended release (Bydureon bcise) (weekly)- Injection  hold 7 days prior to procedure, Exenatide (Byetta) (twice daily)- Oral Tablet hold day prior and morning of procedure and for Injection hold 7 days prior to procedure, Semaglutide (Ozempic) (weekly)- Injection and Oral hold 7 days prior to procedure, Liraglutide (Victoza, Saxenda) (daily)- Injection hold day prior and morning of procedure    Allergies   Allergen Reactions    Acetaminophen      Other Reaction(s): *Unknown    Codeine      Other Reaction(s): Unknown    Oxycodone Nausea    Pravastatin      Other Reaction(s): muscle pain    Simvastatin      Other Reaction(s): achiness    Sulfa Antibiotics        Current Outpatient Medications:     cholecalciferol, vitamin D3, 1,000 unit (25 mcg) tablet, [CHOLECALCIFEROL, VITAMIN D3, 1,000 UNIT (25 MCG) TABLET] Take 1,000 Units by mouth daily. (Patient not taking: Reported on 12/21/2023), Disp: , Rfl:     ibuprofen (ADVIL,MOTRIN) 200 MG tablet, [IBUPROFEN (ADVIL,MOTRIN) 200 MG TABLET] Take 400 mg by mouth every 8 (eight) hours as needed for pain. , Disp: , Rfl:     lisinopril (ZESTRIL) 40 MG tablet, Take 40 mg by mouth daily, Disp: , Rfl:     loratadine-pseudoephedrine (LORATADINE-PSEUDOEPHEDRINE) 5-120 mg Tb12, Take 1 tablet by mouth daily as needed for allergies (Patient not taking: Reported on 12/21/2023), Disp: , Rfl:     omeprazole (PRILOSEC) 20 MG DR capsule, Take 20 mg by mouth daily as needed (acid reflux) (Patient not taking: Reported on 12/21/2023), Disp: , Rfl:     Documentation Date:1/4/2024 10:37 AM  India Bowling RN

## 2024-01-04 NOTE — TELEPHONE ENCOUNTER
Phone call from pts daughter Shayy, she states the pt had time to think about it and would like to move forward with the loop implant.     Reviewed recent office visit with Dr. Watkins, loop recommended 12/21 d/t syncope after 2 week monitor was WNL.    Dr. Hickman what company would you like?     I let her know I will prep and have scheduling contact her to get this scheduled.    No further questions or concerns at this time.    India

## 2024-01-11 ENCOUNTER — TELEPHONE (OUTPATIENT)
Dept: CARDIOLOGY | Facility: CLINIC | Age: 84
End: 2024-01-11
Payer: MEDICARE

## 2024-01-11 NOTE — TELEPHONE ENCOUNTER
Pre-Procedure Education    Procedure: ILR with Dr Jameson on 1-19-24 with arrival time 6:00 am    Phone call to patient and she states she is still thinking about procedure and was not aware it was scheduled.  She would like to think about it with her  and daughter and call if they would like to proceed.    Chart reviewed and daughter Jamaica called wanting to schedule procedure on 1-4-24, daughter was contacted to schedule on 1-10-24.    Return call to patient, no answer and unable to leave message.    Phone call to daughter, she states she wanted to wait a little bit to discuss again with her Mom, she is starting to have memory issues and is overwhelmed by changes.  The daughter lives in [place] and goes back and forth to help with her parents and their health issues.  Her plan was to speak with her Mom tomorrow and discuss the procedure again.  She would like to call us after her discussion with her to let us know if patient really would like to postpone or continue as scheduled.  Reviewed contact information and will await update from daughterJamaica.

## 2024-01-15 NOTE — TELEPHONE ENCOUNTER
Phone call received back from pts massimo Berumen.  She states they had a great discussion and the pt would like to move forward with the procedure.      Pt had further questions regarding how the device works after its implanted, I let her know that I was not entirely sure but I would have the device team reach out to discuss her specific questions.    Pt daughter is worried that her mother will not know how to work the monitors/phone that comes along with implant.    Will forward to device Rns to call back.    Jamaica-  [phone]    Pre-Procedure Education    Procedure: ILR with Dr Worthington on 1/19 with arrival time 6:00 am    COVID: COVID policy- if pt develops COVID like symptoms prior to procedure, he/she would need to complete an at home with a rapid antigen COVID test 1-2 days prior to your procedure date. If COVID + pt is aware the procedure will need to be rescheduled, and to contact CV scheduling as soon as possible    Pre-Op H&P: Completed- Available in Epic    Education:   Contact: Reviewed via phone with pt  Pre-Procedure Instruction: NPO after midnight pre procedure, Defined NPO with pt, Remove all jewelry and leave all valuables at home, Shower prior to arrival, Sedation plan/orders, Transportation requirements and arrangements post procedure, Post-procedure follow up process, Post-procedure restrictions/expectations, and Pre-procedure letter sent- letter tab  Risks:  Implantable Loop Recorder Insertion  <1% for Infection  < 1% Bleeding, Hematoma (increased with anticoagulation therapy)   <1% generator malfunction     Medication:   Instructions regarding anticoagulants: Pt not on AC- N/A  Instructions regarding antiarrhythmic medication: None; N/A  Instructions given to pt regarding diuretics medication: None  Instructions given to pt regarding DM/GLP-1 medication:   DM- None  GLP-1- None  Instructions for medication, other than anticoagulants and antiarrhythmics listed above, given to pt: Take all  medication AM of procedure with small sips of water     Important patient information for staff: None    1/15/2024 4:10 PM  MERRITT Benton

## 2024-01-16 NOTE — TELEPHONE ENCOUNTER
Call placed to daughter to review questions. She is concerned about her mother not being able to use a smart joselito. Discussed that majority of the arrhythmias do have automatic alerts, so would just have to periodically make sure connected to bluetooth/wifi when needed. Also discussed possibility of using the symptom tracker. Reviewed that the device is usually paired and ready to go by the device rep. Reviewed with daughter that once device is implanted we will go over more detailed education at the post-op visit. She appreciates this information .Denies any other questions/concerns at this time.     Gila Boyle, RN

## 2024-01-19 ENCOUNTER — HOSPITAL ENCOUNTER (OUTPATIENT)
Facility: HOSPITAL | Age: 84
Discharge: HOME | End: 2024-01-19
Attending: INTERNAL MEDICINE | Admitting: INTERNAL MEDICINE
Payer: MEDICARE

## 2024-01-19 VITALS
BODY MASS INDEX: 20.9 KG/M2 | HEIGHT: 70 IN | HEART RATE: 76 BPM | WEIGHT: 146 LBS | DIASTOLIC BLOOD PRESSURE: 76 MMHG | RESPIRATION RATE: 18 BRPM | TEMPERATURE: 97.5 F | OXYGEN SATURATION: 100 % | SYSTOLIC BLOOD PRESSURE: 182 MMHG

## 2024-01-19 DIAGNOSIS — R55 SYNCOPE, UNSPECIFIED SYNCOPE TYPE: ICD-10-CM

## 2024-01-19 PROCEDURE — C1764 EVENT RECORDER, CARDIAC: HCPCS | Performed by: INTERNAL MEDICINE

## 2024-01-19 PROCEDURE — 33285 INSJ SUBQ CAR RHYTHM MNTR: CPT | Performed by: INTERNAL MEDICINE

## 2024-01-19 PROCEDURE — 250N000009 HC RX 250: Performed by: INTERNAL MEDICINE

## 2024-01-19 DEVICE — MONITOR CARDIAC LUX-DX II+ M312: Type: IMPLANTABLE DEVICE | Site: STERNUM | Status: FUNCTIONAL

## 2024-01-19 RX ORDER — LIDOCAINE 40 MG/G
CREAM TOPICAL
Status: DISCONTINUED | OUTPATIENT
Start: 2024-01-19 | End: 2024-01-19 | Stop reason: HOSPADM

## 2024-01-19 RX ORDER — SODIUM CHLORIDE 9 MG/ML
100 INJECTION, SOLUTION INTRAVENOUS CONTINUOUS
Status: DISCONTINUED | OUTPATIENT
Start: 2024-01-19 | End: 2024-01-19 | Stop reason: HOSPADM

## 2024-01-19 ASSESSMENT — ACTIVITIES OF DAILY LIVING (ADL)
ADLS_ACUITY_SCORE: 37
ADLS_ACUITY_SCORE: 37

## 2024-01-19 NOTE — Clinical Note
Case delayed due to rep not present. LMOR for pt to [facility] re: lab results.  Pt has an appt on Friday.6-1-18   This document is complete and the patient is ready for discharge.

## 2024-01-19 NOTE — PLAN OF CARE
Pt alert and oriented. Vss on room air. Pt ready to be discharged to home via family. Procedural site cdi. Pt and family given discharge instructions. Pt and family understand discharge teaching.

## 2024-01-19 NOTE — PROGRESS NOTES
Patient was [place] [facility] [place] granddaughter with concerns of bleeding from the loop recorder insertion site. Patient reports that she bent down to  something and the site start bleeding through her bra and her shirt then they deciding to return to [facility].      Site noted to be bleeding. Firm pressure applied for about 30 min, and then pressure dressing applied ( Dr. Ander bedolla) . Site was monitored for additional 30 min.   No further bleeding/hematoma noted. Patient discharged home with her granddaughter in stable condition. No questions or concerns at time of discharge.

## 2024-01-19 NOTE — PLAN OF CARE
Pt alert and oriented. Vss on room air. Pt ready for loop recorder implant.

## 2024-01-26 ENCOUNTER — ANCILLARY PROCEDURE (OUTPATIENT)
Dept: CARDIOLOGY | Facility: CLINIC | Age: 84
End: 2024-01-26
Attending: INTERNAL MEDICINE
Payer: MEDICARE

## 2024-01-26 DIAGNOSIS — Z95.818 IMPLANTABLE LOOP RECORDER PRESENT: ICD-10-CM

## 2024-01-26 LAB
MDC_IDC_EPISODE_DTM: NORMAL
MDC_IDC_EPISODE_ID: NORMAL
MDC_IDC_EPISODE_TYPE: NORMAL
MDC_IDC_MSMT_BATTERY_DTM: NORMAL
MDC_IDC_MSMT_BATTERY_STATUS: NORMAL
MDC_IDC_PG_IMPLANT_DTM: NORMAL
MDC_IDC_PG_MFG: NORMAL
MDC_IDC_PG_MODEL: NORMAL
MDC_IDC_PG_SERIAL: NORMAL
MDC_IDC_PG_TYPE: NORMAL
MDC_IDC_SESS_CLINIC_NAME: NORMAL
MDC_IDC_SESS_DTM: NORMAL
MDC_IDC_SESS_TYPE: NORMAL
MDC_IDC_STAT_AT_BURDEN_PERCENT: 0 %
MDC_IDC_STAT_AT_DTM_END: NORMAL
MDC_IDC_STAT_AT_DTM_START: NORMAL

## 2024-01-26 PROCEDURE — 93291 INTERROG DEV EVAL SCRMS IP: CPT | Performed by: INTERNAL MEDICINE

## 2024-04-05 ENCOUNTER — ANCILLARY PROCEDURE (OUTPATIENT)
Dept: CARDIOLOGY | Facility: CLINIC | Age: 84
End: 2024-04-05
Attending: INTERNAL MEDICINE
Payer: MEDICARE

## 2024-04-05 ENCOUNTER — TELEPHONE (OUTPATIENT)
Dept: CARDIOLOGY | Facility: CLINIC | Age: 84
End: 2024-04-05
Payer: MEDICARE

## 2024-04-05 DIAGNOSIS — Z95.818 IMPLANTABLE LOOP RECORDER PRESENT: Primary | ICD-10-CM

## 2024-04-05 DIAGNOSIS — R55 SYNCOPE, UNSPECIFIED SYNCOPE TYPE: ICD-10-CM

## 2024-04-05 DIAGNOSIS — Z95.818 IMPLANTABLE LOOP RECORDER PRESENT: ICD-10-CM

## 2024-04-05 NOTE — TELEPHONE ENCOUNTER
"----- Message from Lisette JACKSON Yonnyec sent at 4/5/2024  6:59 AM CDT -----  Regarding: device RN review  No charge. No Epic interface required.  Type: loop recorder remote transmission done for symptoms.   Device: BSCI Lux.   Presenting rhythm: Sinus 90 bpm.   Battery status: OK  Arrhythmias: since 1/19/24; One symptom triggered episode on 4/4/24 10:44PM, appears to be normal sinus  bpm.   plan: Routed to device RN for review. NORMA Werner, Device Specialist     Transmissions reviewed, no significant events noted to correlate with syncope. Verified that appropriate detections are on.     Call placed to patient, spoke with patient's granddaughter who helps her with monitoring of device. States she did faint and \"was out for about 1-2 mins. We did call ambulance and they did EKG which showed nothing either.\"   States she noticed that the appt was not connecting/monitoring device was off. So she turned it back on and sent transmission after. Reviewed nothing significant was recorded by device last evening and recommended notifying her PMD to follow up. Verbalized understanding. Gave device clinic callback number if any other questions/concerns.     Gila Boyle RN      "

## 2024-04-05 NOTE — TELEPHONE ENCOUNTER
4/5/24: Urgency care MD called Dr Walton about patient who went to Urgent Care due to syncope episodes. While on Monitor at Urgent Care, multiple pauses were seen He will try to have patient send in remote transmission.  Conchita Sharma Device RN  Called home phone number and granddaughter will tell pt to come to clinic between 130-2 to check her ILR.  Connie Helppi, Device RN      Encounter type: Courtesy In Clinic ILR check  245 pm  Implanted on: 1/19/2024      For Dx: Syncope  Presenting Rhythm: NSR 91  Symptom: 1 symptom last evening, no episodes, ECG shows SR  Tachy: none  Pause: none  Reginald: none  Time in AT/AF: none  Heart rates: , primarily  bpm  Plan: Next remote due on 4/30/2024. Reviewed how to use the joselito to send remotes- also showed patient and granddaughter what we are monitoring. Urgent care reported today seeing a lot of missed beats. Asked patient to check BP at home and reviewed drinking enough water and staying hydrated. /66 last evening and 141/82 today. Will forward to Dr Jameson and encouraged pt to make appt with him in ~1 month. Conchita Sharma Device RN

## 2024-04-09 LAB
MDC_IDC_PG_IMPLANT_DTM: NORMAL
MDC_IDC_PG_MFG: NORMAL
MDC_IDC_PG_MODEL: NORMAL
MDC_IDC_PG_SERIAL: NORMAL
MDC_IDC_PG_TYPE: NORMAL
MDC_IDC_SESS_CLINIC_NAME: NORMAL
MDC_IDC_SESS_DTM: NORMAL
MDC_IDC_SESS_TYPE: NORMAL

## 2024-04-09 PROCEDURE — 93291 INTERROG DEV EVAL SCRMS IP: CPT | Performed by: INTERNAL MEDICINE

## 2024-04-30 ENCOUNTER — ANCILLARY PROCEDURE (OUTPATIENT)
Dept: CARDIOLOGY | Facility: CLINIC | Age: 84
End: 2024-04-30
Attending: INTERNAL MEDICINE
Payer: MEDICARE

## 2024-04-30 DIAGNOSIS — Z98.890 HISTORY OF LOOP RECORDER: ICD-10-CM

## 2024-05-01 PROCEDURE — 93298 REM INTERROG DEV EVAL SCRMS: CPT | Performed by: INTERNAL MEDICINE

## 2024-05-24 ENCOUNTER — TELEPHONE (OUTPATIENT)
Dept: CARDIOLOGY | Facility: CLINIC | Age: 84
End: 2024-05-24

## 2024-05-24 ENCOUNTER — ANCILLARY PROCEDURE (OUTPATIENT)
Dept: CARDIOLOGY | Facility: CLINIC | Age: 84
End: 2024-05-24
Attending: INTERNAL MEDICINE
Payer: MEDICARE

## 2024-05-24 DIAGNOSIS — Z98.890 HISTORY OF LOOP RECORDER: ICD-10-CM

## 2024-05-24 LAB
MDC_IDC_EPISODE_DTM: NORMAL
MDC_IDC_EPISODE_DTM: NORMAL
MDC_IDC_EPISODE_DURATION: 18 S
MDC_IDC_EPISODE_ID: NORMAL
MDC_IDC_EPISODE_ID: NORMAL
MDC_IDC_EPISODE_TYPE: NORMAL
MDC_IDC_EPISODE_TYPE: NORMAL
MDC_IDC_EPISODE_VENDOR_TYPE: NORMAL
MDC_IDC_MSMT_BATTERY_DTM: NORMAL
MDC_IDC_MSMT_BATTERY_STATUS: NORMAL
MDC_IDC_PG_IMPLANT_DTM: NORMAL
MDC_IDC_PG_MFG: NORMAL
MDC_IDC_PG_MODEL: NORMAL
MDC_IDC_PG_SERIAL: NORMAL
MDC_IDC_PG_TYPE: NORMAL
MDC_IDC_SESS_CLINIC_NAME: NORMAL
MDC_IDC_SESS_DTM: NORMAL
MDC_IDC_SESS_TYPE: NORMAL
MDC_IDC_STAT_AT_BURDEN_PERCENT: 0 %
MDC_IDC_STAT_AT_DTM_END: NORMAL
MDC_IDC_STAT_AT_DTM_START: NORMAL

## 2024-05-24 NOTE — TELEPHONE ENCOUNTER
Type: Alert remote loop recorder transmission for Tachy episode, courtesy check.  Device: BSCI LUX-Dx ICM.   Presenting rhythm: Sinus 60 bpm.   Battery status: OK  Arrhythmias: Since 5/1/2024 One tachy episode on 5/23/24, appears to be SVT lasting ~20 seconds, avg rate 183 bpm with max rate ~200 bpm.   Plan: Routed to device RN. Dinora, Device Specialist    Alert for tachy event yesterday morning around 9 AM, lasting ~20 seconds, appears SVT. Attempted to reach patient but no answer and no VM option. Will send [employer] message, will monitor for now if asymptomatic since brief in duration.     Gila Boyle RN

## 2024-08-13 ENCOUNTER — ANCILLARY PROCEDURE (OUTPATIENT)
Dept: CARDIOLOGY | Facility: CLINIC | Age: 84
End: 2024-08-13
Attending: INTERNAL MEDICINE
Payer: MEDICARE

## 2024-08-13 DIAGNOSIS — Z98.890 HISTORY OF LOOP RECORDER: ICD-10-CM

## 2024-08-16 LAB
MDC_IDC_EPISODE_DTM: NORMAL
MDC_IDC_EPISODE_DTM: NORMAL
MDC_IDC_EPISODE_ID: NORMAL
MDC_IDC_EPISODE_ID: NORMAL
MDC_IDC_EPISODE_TYPE: NORMAL
MDC_IDC_EPISODE_TYPE: NORMAL
MDC_IDC_MSMT_BATTERY_DTM: NORMAL
MDC_IDC_MSMT_BATTERY_STATUS: NORMAL
MDC_IDC_PG_IMPLANT_DTM: NORMAL
MDC_IDC_PG_MFG: NORMAL
MDC_IDC_PG_MODEL: NORMAL
MDC_IDC_PG_SERIAL: NORMAL
MDC_IDC_PG_TYPE: NORMAL
MDC_IDC_SESS_CLINIC_NAME: NORMAL
MDC_IDC_SESS_DTM: NORMAL
MDC_IDC_SESS_TYPE: NORMAL
MDC_IDC_STAT_AT_BURDEN_PERCENT: 1 %
MDC_IDC_STAT_AT_DTM_END: NORMAL
MDC_IDC_STAT_AT_DTM_START: NORMAL

## 2024-08-16 PROCEDURE — 93298 REM INTERROG DEV EVAL SCRMS: CPT | Performed by: INTERNAL MEDICINE

## 2024-10-07 ENCOUNTER — LAB REQUISITION (OUTPATIENT)
Dept: LAB | Facility: CLINIC | Age: 84
End: 2024-10-07
Payer: MEDICARE

## 2024-10-07 DIAGNOSIS — I10 ESSENTIAL (PRIMARY) HYPERTENSION: ICD-10-CM

## 2024-10-07 DIAGNOSIS — E78.2 MIXED HYPERLIPIDEMIA: ICD-10-CM

## 2024-10-07 PROCEDURE — 80061 LIPID PANEL: CPT | Mod: ORL

## 2024-10-07 PROCEDURE — 80048 BASIC METABOLIC PNL TOTAL CA: CPT | Mod: ORL

## 2024-10-08 LAB
ANION GAP SERPL CALCULATED.3IONS-SCNC: 9 MMOL/L (ref 7–15)
BUN SERPL-MCNC: 13.4 MG/DL (ref 8–23)
CALCIUM SERPL-MCNC: 9.3 MG/DL (ref 8.8–10.4)
CHLORIDE SERPL-SCNC: 102 MMOL/L (ref 98–107)
CHOLEST SERPL-MCNC: 207 MG/DL
CREAT SERPL-MCNC: 0.75 MG/DL (ref 0.51–0.95)
EGFRCR SERPLBLD CKD-EPI 2021: 77 ML/MIN/1.73M2
FASTING STATUS PATIENT QL REPORTED: ABNORMAL
FASTING STATUS PATIENT QL REPORTED: ABNORMAL
GLUCOSE SERPL-MCNC: 91 MG/DL (ref 70–99)
HCO3 SERPL-SCNC: 22 MMOL/L (ref 22–29)
HDLC SERPL-MCNC: 67 MG/DL
LDLC SERPL CALC-MCNC: 125 MG/DL
NONHDLC SERPL-MCNC: 140 MG/DL
POTASSIUM SERPL-SCNC: 4.6 MMOL/L (ref 3.4–5.3)
SODIUM SERPL-SCNC: 133 MMOL/L (ref 135–145)
TRIGL SERPL-MCNC: 76 MG/DL

## 2024-10-14 ENCOUNTER — TELEPHONE (OUTPATIENT)
Dept: CARDIOLOGY | Facility: CLINIC | Age: 84
End: 2024-10-14
Payer: MEDICARE

## 2024-10-14 NOTE — TELEPHONE ENCOUNTER
10/14/24: Daughter called, states they are in [place] right now and forgot to bring the monitoring device. Will reconnect when they return to [place]. States for now her mother has been feeling fine. No issues.     Gila Boyle RN

## 2024-10-14 NOTE — TELEPHONE ENCOUNTER
----- Message from Mandy ABEL sent at 10/14/2024 12:18 PM CDT -----  Hi this patients daughter called returning a call!    [phone]  Jamaica valles    Thanks!  Radha

## 2024-12-02 ENCOUNTER — ANCILLARY PROCEDURE (OUTPATIENT)
Dept: CARDIOLOGY | Facility: CLINIC | Age: 84
End: 2024-12-02
Attending: INTERNAL MEDICINE
Payer: COMMERCIAL

## 2024-12-02 DIAGNOSIS — Z98.890 HISTORY OF LOOP RECORDER: ICD-10-CM

## 2024-12-03 PROCEDURE — 93298 REM INTERROG DEV EVAL SCRMS: CPT | Performed by: INTERNAL MEDICINE

## 2024-12-07 ENCOUNTER — HEALTH MAINTENANCE LETTER (OUTPATIENT)
Age: 84
End: 2024-12-07

## 2025-01-03 ENCOUNTER — HOSPITAL ENCOUNTER (INPATIENT)
Facility: HOSPITAL | Age: 85
LOS: 1 days | Discharge: HOME | DRG: 389 | End: 2025-01-04
Attending: EMERGENCY MEDICINE | Admitting: STUDENT IN AN ORGANIZED HEALTH CARE EDUCATION/TRAINING PROGRAM
Payer: MEDICARE

## 2025-01-03 ENCOUNTER — APPOINTMENT (OUTPATIENT)
Dept: CT IMAGING | Facility: HOSPITAL | Age: 85
DRG: 389 | End: 2025-01-03
Attending: EMERGENCY MEDICINE

## 2025-01-03 DIAGNOSIS — K59.01 SLOW TRANSIT CONSTIPATION: Primary | ICD-10-CM

## 2025-01-03 DIAGNOSIS — K59.00 CONSTIPATION, UNSPECIFIED CONSTIPATION TYPE: ICD-10-CM

## 2025-01-03 DIAGNOSIS — K56.7 ILEUS (H): ICD-10-CM

## 2025-01-03 PROBLEM — R10.9 ABDOMINAL PAIN: Status: ACTIVE | Noted: 2025-01-03

## 2025-01-03 PROBLEM — K56.600 PARTIAL SMALL BOWEL OBSTRUCTION (H): Status: ACTIVE | Noted: 2025-01-03

## 2025-01-03 LAB
ALBUMIN SERPL BCG-MCNC: 4.1 G/DL (ref 3.5–5.2)
ALP SERPL-CCNC: 69 U/L (ref 40–150)
ALT SERPL W P-5'-P-CCNC: 12 U/L (ref 0–50)
ANION GAP SERPL CALCULATED.3IONS-SCNC: 9 MMOL/L (ref 7–15)
AST SERPL W P-5'-P-CCNC: 18 U/L (ref 0–45)
BASOPHILS # BLD AUTO: 0 10E3/UL (ref 0–0.2)
BASOPHILS NFR BLD AUTO: 0 %
BILIRUB DIRECT SERPL-MCNC: <0.2 MG/DL (ref 0–0.3)
BILIRUB SERPL-MCNC: 0.7 MG/DL
BUN SERPL-MCNC: 11 MG/DL (ref 8–23)
CALCIUM SERPL-MCNC: 9.3 MG/DL (ref 8.8–10.4)
CHLORIDE SERPL-SCNC: 98 MMOL/L (ref 98–107)
CREAT SERPL-MCNC: 0.78 MG/DL (ref 0.51–0.95)
EGFRCR SERPLBLD CKD-EPI 2021: 74 ML/MIN/1.73M2
EOSINOPHIL # BLD AUTO: 0.1 10E3/UL (ref 0–0.7)
EOSINOPHIL NFR BLD AUTO: 1 %
ERYTHROCYTE [DISTWIDTH] IN BLOOD BY AUTOMATED COUNT: 13.6 % (ref 10–15)
EST. AVERAGE GLUCOSE BLD GHB EST-MCNC: 97 MG/DL
GLUCOSE BLDC GLUCOMTR-MCNC: 94 MG/DL (ref 70–99)
GLUCOSE SERPL-MCNC: 100 MG/DL (ref 70–99)
HBA1C MFR BLD: 5 %
HCO3 SERPL-SCNC: 25 MMOL/L (ref 22–29)
HCT VFR BLD AUTO: 42.5 % (ref 35–47)
HGB BLD-MCNC: 14.4 G/DL (ref 11.7–15.7)
IMM GRANULOCYTES # BLD: 0 10E3/UL
IMM GRANULOCYTES NFR BLD: 0 %
LACTATE SERPL-SCNC: 0.6 MMOL/L (ref 0.7–2)
LIPASE SERPL-CCNC: 37 U/L (ref 13–60)
LYMPHOCYTES # BLD AUTO: 2.1 10E3/UL (ref 0.8–5.3)
LYMPHOCYTES NFR BLD AUTO: 31 %
MAGNESIUM SERPL-MCNC: 2 MG/DL (ref 1.7–2.3)
MCH RBC QN AUTO: 30.2 PG (ref 26.5–33)
MCHC RBC AUTO-ENTMCNC: 33.9 G/DL (ref 31.5–36.5)
MCV RBC AUTO: 89 FL (ref 78–100)
MONOCYTES # BLD AUTO: 0.5 10E3/UL (ref 0–1.3)
MONOCYTES NFR BLD AUTO: 8 %
NEUTROPHILS # BLD AUTO: 4.1 10E3/UL (ref 1.6–8.3)
NEUTROPHILS NFR BLD AUTO: 61 %
NRBC # BLD AUTO: 0 10E3/UL
NRBC BLD AUTO-RTO: 0 /100
PHOSPHATE SERPL-MCNC: 3.7 MG/DL (ref 2.5–4.5)
PLATELET # BLD AUTO: 324 10E3/UL (ref 150–450)
POTASSIUM SERPL-SCNC: 4.9 MMOL/L (ref 3.4–5.3)
PROT SERPL-MCNC: 6.1 G/DL (ref 6.4–8.3)
RBC # BLD AUTO: 4.77 10E6/UL (ref 3.8–5.2)
SODIUM SERPL-SCNC: 132 MMOL/L (ref 135–145)
WBC # BLD AUTO: 6.8 10E3/UL (ref 4–11)

## 2025-01-03 PROCEDURE — 83036 HEMOGLOBIN GLYCOSYLATED A1C: CPT | Performed by: STUDENT IN AN ORGANIZED HEALTH CARE EDUCATION/TRAINING PROGRAM

## 2025-01-03 PROCEDURE — 74177 CT ABD & PELVIS W/CONTRAST: CPT

## 2025-01-03 PROCEDURE — 85004 AUTOMATED DIFF WBC COUNT: CPT | Performed by: EMERGENCY MEDICINE

## 2025-01-03 PROCEDURE — 99285 EMERGENCY DEPT VISIT HI MDM: CPT | Mod: 25

## 2025-01-03 PROCEDURE — 84100 ASSAY OF PHOSPHORUS: CPT | Performed by: STUDENT IN AN ORGANIZED HEALTH CARE EDUCATION/TRAINING PROGRAM

## 2025-01-03 PROCEDURE — 36415 COLL VENOUS BLD VENIPUNCTURE: CPT | Performed by: EMERGENCY MEDICINE

## 2025-01-03 PROCEDURE — 120N000001 HC R&B MED SURG/OB

## 2025-01-03 PROCEDURE — 82248 BILIRUBIN DIRECT: CPT | Performed by: EMERGENCY MEDICINE

## 2025-01-03 PROCEDURE — 82962 GLUCOSE BLOOD TEST: CPT

## 2025-01-03 PROCEDURE — 83690 ASSAY OF LIPASE: CPT | Performed by: EMERGENCY MEDICINE

## 2025-01-03 PROCEDURE — 250N000011 HC RX IP 250 OP 636: Performed by: EMERGENCY MEDICINE

## 2025-01-03 PROCEDURE — 80051 ELECTROLYTE PANEL: CPT | Performed by: EMERGENCY MEDICINE

## 2025-01-03 PROCEDURE — 99223 1ST HOSP IP/OBS HIGH 75: CPT | Performed by: STUDENT IN AN ORGANIZED HEALTH CARE EDUCATION/TRAINING PROGRAM

## 2025-01-03 PROCEDURE — 83735 ASSAY OF MAGNESIUM: CPT | Performed by: STUDENT IN AN ORGANIZED HEALTH CARE EDUCATION/TRAINING PROGRAM

## 2025-01-03 PROCEDURE — 258N000003 HC RX IP 258 OP 636: Performed by: STUDENT IN AN ORGANIZED HEALTH CARE EDUCATION/TRAINING PROGRAM

## 2025-01-03 PROCEDURE — 83605 ASSAY OF LACTIC ACID: CPT | Performed by: EMERGENCY MEDICINE

## 2025-01-03 RX ORDER — HYDRALAZINE HYDROCHLORIDE 10 MG/1
10 TABLET, FILM COATED ORAL EVERY 4 HOURS PRN
Status: DISCONTINUED | OUTPATIENT
Start: 2025-01-03 | End: 2025-01-04 | Stop reason: HOSPADM

## 2025-01-03 RX ORDER — IOPAMIDOL 755 MG/ML
70 INJECTION, SOLUTION INTRAVASCULAR ONCE
Status: COMPLETED | OUTPATIENT
Start: 2025-01-03 | End: 2025-01-03

## 2025-01-03 RX ORDER — LIDOCAINE 40 MG/G
CREAM TOPICAL
Status: DISCONTINUED | OUTPATIENT
Start: 2025-01-03 | End: 2025-01-04 | Stop reason: HOSPADM

## 2025-01-03 RX ORDER — HYDROMORPHONE HCL IN WATER/PF 6 MG/30 ML
0.4 PATIENT CONTROLLED ANALGESIA SYRINGE INTRAVENOUS
Status: DISCONTINUED | OUTPATIENT
Start: 2025-01-03 | End: 2025-01-04 | Stop reason: HOSPADM

## 2025-01-03 RX ORDER — PANTOPRAZOLE SODIUM 40 MG/1
40 TABLET, DELAYED RELEASE ORAL DAILY PRN
Status: DISCONTINUED | OUTPATIENT
Start: 2025-01-03 | End: 2025-01-04 | Stop reason: HOSPADM

## 2025-01-03 RX ORDER — ONDANSETRON 2 MG/ML
4 INJECTION INTRAMUSCULAR; INTRAVENOUS EVERY 6 HOURS PRN
Status: DISCONTINUED | OUTPATIENT
Start: 2025-01-03 | End: 2025-01-04 | Stop reason: HOSPADM

## 2025-01-03 RX ORDER — AMOXICILLIN 250 MG
1 CAPSULE ORAL 2 TIMES DAILY PRN
Status: DISCONTINUED | OUTPATIENT
Start: 2025-01-03 | End: 2025-01-04 | Stop reason: HOSPADM

## 2025-01-03 RX ORDER — SODIUM CHLORIDE 9 MG/ML
INJECTION, SOLUTION INTRAVENOUS CONTINUOUS
Status: DISCONTINUED | OUTPATIENT
Start: 2025-01-03 | End: 2025-01-04

## 2025-01-03 RX ORDER — LISINOPRIL 20 MG/1
40 TABLET ORAL EVERY MORNING
Status: DISCONTINUED | OUTPATIENT
Start: 2025-01-04 | End: 2025-01-04 | Stop reason: HOSPADM

## 2025-01-03 RX ORDER — HYDRALAZINE HYDROCHLORIDE 20 MG/ML
10 INJECTION INTRAMUSCULAR; INTRAVENOUS EVERY 4 HOURS PRN
Status: DISCONTINUED | OUTPATIENT
Start: 2025-01-03 | End: 2025-01-04 | Stop reason: HOSPADM

## 2025-01-03 RX ORDER — CALCIUM CARBONATE 500 MG/1
1000 TABLET, CHEWABLE ORAL 4 TIMES DAILY PRN
Status: DISCONTINUED | OUTPATIENT
Start: 2025-01-03 | End: 2025-01-04 | Stop reason: HOSPADM

## 2025-01-03 RX ORDER — AMOXICILLIN 250 MG
2 CAPSULE ORAL 2 TIMES DAILY PRN
Status: DISCONTINUED | OUTPATIENT
Start: 2025-01-03 | End: 2025-01-04 | Stop reason: HOSPADM

## 2025-01-03 RX ORDER — HYDROMORPHONE HCL IN WATER/PF 6 MG/30 ML
0.2 PATIENT CONTROLLED ANALGESIA SYRINGE INTRAVENOUS
Status: DISCONTINUED | OUTPATIENT
Start: 2025-01-03 | End: 2025-01-04 | Stop reason: HOSPADM

## 2025-01-03 RX ORDER — OXYCODONE HYDROCHLORIDE 5 MG/1
5 TABLET ORAL EVERY 4 HOURS PRN
Status: DISCONTINUED | OUTPATIENT
Start: 2025-01-03 | End: 2025-01-04 | Stop reason: HOSPADM

## 2025-01-03 RX ORDER — ONDANSETRON 4 MG/1
4 TABLET, ORALLY DISINTEGRATING ORAL EVERY 6 HOURS PRN
Status: DISCONTINUED | OUTPATIENT
Start: 2025-01-03 | End: 2025-01-04 | Stop reason: HOSPADM

## 2025-01-03 RX ADMIN — IOPAMIDOL 70 ML: 755 INJECTION, SOLUTION INTRAVENOUS at 19:12

## 2025-01-03 RX ADMIN — SODIUM CHLORIDE: 9 INJECTION, SOLUTION INTRAVENOUS at 22:07

## 2025-01-03 ASSESSMENT — COLUMBIA-SUICIDE SEVERITY RATING SCALE - C-SSRS
2. HAVE YOU ACTUALLY HAD ANY THOUGHTS OF KILLING YOURSELF IN THE PAST MONTH?: NO
6. HAVE YOU EVER DONE ANYTHING, STARTED TO DO ANYTHING, OR PREPARED TO DO ANYTHING TO END YOUR LIFE?: NO
1. IN THE PAST MONTH, HAVE YOU WISHED YOU WERE DEAD OR WISHED YOU COULD GO TO SLEEP AND NOT WAKE UP?: NO

## 2025-01-03 ASSESSMENT — ACTIVITIES OF DAILY LIVING (ADL)
ADLS_ACUITY_SCORE: 55
ADLS_ACUITY_SCORE: 54

## 2025-01-03 NOTE — ED TRIAGE NOTES
Pt arrived via triage. Pt reports at clinic Tuesday and dx with bowel obstruction, went today for f/u and no change. Sent for more treatment. Denies pain. Denies vomiting.     Triage Assessment (Adult)       Row Name 01/03/25 1649          Triage Assessment    Airway WDL WDL        Respiratory WDL    Respiratory WDL WDL        Skin Circulation/Temperature WDL    Skin Circulation/Temperature WDL WDL        Cardiac WDL    Cardiac WDL WDL        Peripheral/Neurovascular WDL    Peripheral Neurovascular WDL WDL        Cognitive/Neuro/Behavioral WDL    Cognitive/Neuro/Behavioral WDL orientation  baseline altered

## 2025-01-03 NOTE — ED PROVIDER NOTES
EMERGENCY DEPARTMENT NOTE     Name: Alisson Gooden    Age/Sex: 84 year old female   MRN: [id number]   Evaluation Date & Time:  No admission date for patient encounter.    PCP:    Vani Berkowitz   ED Provider: Ricardo Novoa D.O.       CHIEF COMPLAINT    Constipation     HISTORY OF PRESENT ILLNESS   Alisson Gooden is a 84 year old year old female with a relevant past history of arthritis, heart disease, memory loss, HTN, HLD, syncope, and sciatica, who presents to the ED for evaluation of constipation and possible bowel obstruction.  Patient has not had significant bowel movement for 5 days.  Patient has been at a primary care clinic 2 times this week and have been placed on bowel regime without significant bowel movement.  She has had abdominal flat and upright x-rays which were concerning for ileus versus partial small bowel obstruction.  Second x-ray today was unchanged and patient was referred to the ED for evaluation.  Patient has had no nausea vomiting, denies abdominal pain.  Another review of systems no fever, URI symptoms chest pain shortness of breath or urinary difficulty    Patient reports     DIAGNOSIS & DISPOSITION/MEDICAL DECISION MAKING     1. Constipation, unspecified constipation type    2. Ileus (H)        EMERGENCY DEPARTMENT COURSE   4:36 PM I met with the patient to gather history and to perform my initial exam.  We discussed treatment options and the plan for care while in the Emergency Department.  Triage vital signs:BP (!) 156/90   Pulse 84   Temp 98.2  F (36.8  C)   Resp 18   Wt 64.9 kg (143 lb)   SpO2 96%   BMI 20.52 kg/m     Differential diagnosis considered included but not limited to: Bowel obstruction or perforation, colonic mass, bowel ischemia, electrolyte derangement, ARF    MDM: Patient on exam had nontender abdomen and no palpable mass to suggest incarcerated hernia.  CT of the abdomen pelvis obtained and showed constipation with large amount of stool throughout the colon.   Patient did have multiple dilated small bowel loops which may be indicative of ileus, less likely partial small bowel obstruction.  Labs were obtained and reviewed by myself: CBC and comprehensive metabolic profile within normal limits except for sodium 132.  Lactic acid 0.6  Patient has had no vomiting , abdominal exam is nontender and not indicative of incarcerated hernia and will defer NG tube placement  Will admit the patient for treatment of constipation with enemas and repeat imaging.  Case was discussed with the hospitalist service.     Discharge Vital Signs:BP (!) 156/90   Pulse 84   Temp 98.2  F (36.8  C)   Resp 18   Wt 64.9 kg (143 lb)   SpO2 96%   BMI 20.52 kg/m     PROCEDURES:   None  Diagnostic studies:  CT Abdomen Pelvis w Contrast   Final Result   IMPRESSION:    1.  Significant elevation left hemidiaphragm. Left-to-right mediastinal shift.      2.  There are multiple dilated loops of small bowel present. Apparent mesenteric defect in the mid abdomen with several small bowel loops coursing through this defect demonstrating narrowing. There is at least probable partial small bowel obstruction due    to the defect. Large amount of stool throughout the colon including the distal colon and rectum.      3.  Previous cholecystectomy and hysterectomy.        Labs Ordered and Resulted from Time of ED Arrival to Time of ED Departure   BASIC METABOLIC PANEL - Abnormal       Result Value    Sodium 132 (*)     Potassium 4.9      Chloride 98      Carbon Dioxide (CO2) 25      Anion Gap 9      Urea Nitrogen 11.0      Creatinine 0.78      GFR Estimate 74      Calcium 9.3      Glucose 100 (*)    HEPATIC FUNCTION PANEL - Abnormal    Protein Total 6.1 (*)     Albumin 4.1      Bilirubin Total 0.7      Alkaline Phosphatase 69      AST 18      ALT 12      Bilirubin Direct <0.20     LACTIC ACID WHOLE BLOOD WITH 1X REPEAT IN 2 HR WHEN >2 - Abnormal    Lactic Acid, Initial 0.6 (*)    LIPASE - Normal    Lipase 37     CBC  WITH PLATELETS AND DIFFERENTIAL    WBC Count 6.8      RBC Count 4.77      Hemoglobin 14.4      Hematocrit 42.5      MCV 89      MCH 30.2      MCHC 33.9      RDW 13.6      Platelet Count 324      % Neutrophils 61      % Lymphocytes 31      % Monocytes 8      % Eosinophils 1      % Basophils 0      % Immature Granulocytes 0      NRBCs per 100 WBC 0      Absolute Neutrophils 4.1      Absolute Lymphocytes 2.1      Absolute Monocytes 0.5      Absolute Eosinophils 0.1      Absolute Basophils 0.0      Absolute Immature Granulocytes 0.0      Absolute NRBCs 0.0       ED INTERVENTIONS     Medications   lisinopril (ZESTRIL) tablet 40 mg (has no administration in time range)   pantoprazole (PROTONIX) EC tablet 40 mg (has no administration in time range)   iopamidol (ISOVUE-370) solution 70 mL (70 mLs Intravenous $Given 1/3/25 1912)     TOTAL CRITICAL CARE TIME (EXCLUDING PROCEDURES): Not applicable      DISCHARGE MEDICATIONS        Review of your medicines        UNREVIEWED medicines. Ask your doctor about these medicines        Dose / Directions   ibuprofen 200 MG tablet  Commonly known as: ADVIL/MOTRIN      Dose: 400 mg  [IBUPROFEN (ADVIL,MOTRIN) 200 MG TABLET] Take 400 mg by mouth every 8 (eight) hours as needed for pain.  Refills: 0     lisinopril 40 MG tablet  Commonly known as: ZESTRIL      Dose: 40 mg  Take 40 mg by mouth every morning.  Refills: 0     omeprazole 20 MG DR capsule  Commonly known as: PriLOSEC      Dose: 20 mg  Take 20 mg by mouth daily as needed (acid reflux)  Refills: 0     Vitamin D3 25 mcg (1000 units) tablet  Commonly known as: CHOLECALCIFEROL      Dose: 1,000 Units  Take 1,000 Units by mouth three times a week.  Refills: 0            DISPOSITION: Admit [place] [facility]    Medical Decision Making    At the time of my evaluation, I do not feel the patient s symptoms are caused by sepsis      I obtained additional history from these independent historians:  Patient's daughter  I reviewed these  outside records:  Available records from January 3, 2025 [facility] where the patient was evaluated for constipation and had abdominal flat and upright x-rays performed  I noted these abnormal vital signs / labs:  /90, sodium 132    Monitor Strip Interpretation:  NA  12-Lead ECG Interpretation:  NA  I independently reviewed the following diagnostic studies:  CT abdomen pelvis which did not show definitive obstruction, no perforation  I spoke to the following clinicians regard the patients care:  Hospitalist  My disposition decision is based on the following reasons:  Admit:      MIPS: Not Applicable  At the conclusion of the encounter I discussed the results of all of the tests and the disposition. The questions were answered. The patient or family acknowledged understanding and was agreeable with the care plan.      INFORMATION SOURCE AND LIMITATIONS    History/Exam limitations: None  Patient information was obtained from: Patient and her daughter  Use of : N/A    REVIEW OF SYSTEMS:   All other systems reviewed and are negative except as noted above in HPI.    PATIENT HISTORY   History reviewed. No pertinent past medical history.  Patient Active Problem List   Diagnosis    Traumatic closed nondisplaced fracture of neck of femur, left, initial encounter (H)    Hip fracture, left, closed, initial encounter (H)    Benign essential hypertension    Mixed hyperlipidemia    Sciatica    Vitamin B12 deficiency (non anemic)    Hyponatremia    Syncope    Ileus (H)    Constipation, unspecified constipation type     Past Surgical History:   Procedure Laterality Date    APPENDECTOMY      CHOLECYSTECTOMY      ECTOPIC PREGNANCY SURGERY      EP LOOP RECORDER IMPLANT N/A 1/19/2024    Procedure: Loop Recorder Implant;  Surgeon: Ranjan Jameson MD;  Location: [facility] PARTIAL HIP REPLACEMENT Left 5/15/2021    Procedure: LEFT HEMIARTHROPLASTY, HIP, BIPOLAR;  Surgeon: Gin  Jermaine JACKSON MD;  Location: [facility] OR;  Service: Orthopedics       Allergies   Allergen Reactions    Acetaminophen      Other Reaction(s): *Unknown    Codeine      Other Reaction(s): Unknown    Oxycodone Nausea    Pravastatin      Other Reaction(s): muscle pain    Simvastatin      Other Reaction(s): achiness    Sulfa Antibiotics        OUTPATIENT MEDICATIONS     New Prescriptions    No medications on file      Vitals:    01/03/25 1639   BP: (!) 156/90   Pulse: 84   Resp: 18   Temp: 98.2  F (36.8  C)   SpO2: 96%   Weight: 64.9 kg (143 lb)       Physical Exam   Constitutional: Oriented to person, place, and time. Appears well-developed and well-nourished.   Cardiovascular: Normal rate, regular rhythm and normal heart sounds.    Pulmonary/Chest: Normal effort  and breath sounds normal.   Abdominal: Soft. Bowel sounds are normal.   Psychiatric: Normal mood and affect. Behavior is normal. Thought content normal.     DIAGNOSTICS    LABORATORY FINDINGS (REVIEWED AND INTERPRETED):  Labs Ordered and Resulted from Time of ED Arrival to Time of ED Departure   BASIC METABOLIC PANEL - Abnormal       Result Value    Sodium 132 (*)     Potassium 4.9      Chloride 98      Carbon Dioxide (CO2) 25      Anion Gap 9      Urea Nitrogen 11.0      Creatinine 0.78      GFR Estimate 74      Calcium 9.3      Glucose 100 (*)    HEPATIC FUNCTION PANEL - Abnormal    Protein Total 6.1 (*)     Albumin 4.1      Bilirubin Total 0.7      Alkaline Phosphatase 69      AST 18      ALT 12      Bilirubin Direct <0.20     LACTIC ACID WHOLE BLOOD WITH 1X REPEAT IN 2 HR WHEN >2 - Abnormal    Lactic Acid, Initial 0.6 (*)    LIPASE - Normal    Lipase 37     CBC WITH PLATELETS AND DIFFERENTIAL    WBC Count 6.8      RBC Count 4.77      Hemoglobin 14.4      Hematocrit 42.5      MCV 89      MCH 30.2      MCHC 33.9      RDW 13.6      Platelet Count 324      % Neutrophils 61      % Lymphocytes 31      % Monocytes 8      % Eosinophils 1      % Basophils 0      %  Immature Granulocytes 0      NRBCs per 100 WBC 0      Absolute Neutrophils 4.1      Absolute Lymphocytes 2.1      Absolute Monocytes 0.5      Absolute Eosinophils 0.1      Absolute Basophils 0.0      Absolute Immature Granulocytes 0.0      Absolute NRBCs 0.0           IMAGING (REVIEWED AND INTERPRETED):  CT Abdomen Pelvis w Contrast   Final Result   IMPRESSION:    1.  Significant elevation left hemidiaphragm. Left-to-right mediastinal shift.      2.  There are multiple dilated loops of small bowel present. Apparent mesenteric defect in the mid abdomen with several small bowel loops coursing through this defect demonstrating narrowing. There is at least probable partial small bowel obstruction due    to the defect. Large amount of stool throughout the colon including the distal colon and rectum.      3.  Previous cholecystectomy and hysterectomy.                Ricardo Novoa D.O.  EMERGENCY MEDICINE   01/03/25  [facility] EMERGENCY DEPARTMENT  [address] [place] [id number]  [phone]  Dept: [phone]     Ricardo Novoa DO  01/03/25 2156

## 2025-01-04 VITALS
HEART RATE: 78 BPM | TEMPERATURE: 98.1 F | DIASTOLIC BLOOD PRESSURE: 63 MMHG | WEIGHT: 143 LBS | SYSTOLIC BLOOD PRESSURE: 145 MMHG | RESPIRATION RATE: 16 BRPM | OXYGEN SATURATION: 96 % | BODY MASS INDEX: 20.52 KG/M2

## 2025-01-04 PROBLEM — K56.7 ILEUS (H): Status: RESOLVED | Noted: 2025-01-03 | Resolved: 2025-01-04

## 2025-01-04 PROBLEM — I10 BENIGN ESSENTIAL HYPERTENSION: Status: ACTIVE | Noted: 2021-05-14

## 2025-01-04 LAB
ALBUMIN SERPL BCG-MCNC: 3.8 G/DL (ref 3.5–5.2)
ALP SERPL-CCNC: 63 U/L (ref 40–150)
ALT SERPL W P-5'-P-CCNC: 11 U/L (ref 0–50)
ANION GAP SERPL CALCULATED.3IONS-SCNC: 9 MMOL/L (ref 7–15)
AST SERPL W P-5'-P-CCNC: 19 U/L (ref 0–45)
BILIRUB SERPL-MCNC: 0.8 MG/DL
BUN SERPL-MCNC: 8.6 MG/DL (ref 8–23)
CALCIUM SERPL-MCNC: 8.9 MG/DL (ref 8.8–10.4)
CHLORIDE SERPL-SCNC: 101 MMOL/L (ref 98–107)
CREAT SERPL-MCNC: 0.71 MG/DL (ref 0.51–0.95)
EGFRCR SERPLBLD CKD-EPI 2021: 82 ML/MIN/1.73M2
ERYTHROCYTE [DISTWIDTH] IN BLOOD BY AUTOMATED COUNT: 13.7 % (ref 10–15)
GLUCOSE BLDC GLUCOMTR-MCNC: 133 MG/DL (ref 70–99)
GLUCOSE SERPL-MCNC: 86 MG/DL (ref 70–99)
HCO3 SERPL-SCNC: 25 MMOL/L (ref 22–29)
HCT VFR BLD AUTO: 41 % (ref 35–47)
HGB BLD-MCNC: 14.1 G/DL (ref 11.7–15.7)
MAGNESIUM SERPL-MCNC: 1.9 MG/DL (ref 1.7–2.3)
MCH RBC QN AUTO: 30.7 PG (ref 26.5–33)
MCHC RBC AUTO-ENTMCNC: 34.4 G/DL (ref 31.5–36.5)
MCV RBC AUTO: 89 FL (ref 78–100)
PHOSPHATE SERPL-MCNC: 3.7 MG/DL (ref 2.5–4.5)
PLATELET # BLD AUTO: 267 10E3/UL (ref 150–450)
POTASSIUM SERPL-SCNC: 4 MMOL/L (ref 3.4–5.3)
PROT SERPL-MCNC: 5.6 G/DL (ref 6.4–8.3)
RBC # BLD AUTO: 4.6 10E6/UL (ref 3.8–5.2)
SODIUM SERPL-SCNC: 135 MMOL/L (ref 135–145)
WBC # BLD AUTO: 5 10E3/UL (ref 4–11)

## 2025-01-04 PROCEDURE — 84100 ASSAY OF PHOSPHORUS: CPT | Performed by: STUDENT IN AN ORGANIZED HEALTH CARE EDUCATION/TRAINING PROGRAM

## 2025-01-04 PROCEDURE — 99222 1ST HOSP IP/OBS MODERATE 55: CPT | Performed by: PHYSICIAN ASSISTANT

## 2025-01-04 PROCEDURE — 250N000013 HC RX MED GY IP 250 OP 250 PS 637: Performed by: STUDENT IN AN ORGANIZED HEALTH CARE EDUCATION/TRAINING PROGRAM

## 2025-01-04 PROCEDURE — 36415 COLL VENOUS BLD VENIPUNCTURE: CPT | Performed by: STUDENT IN AN ORGANIZED HEALTH CARE EDUCATION/TRAINING PROGRAM

## 2025-01-04 PROCEDURE — 82962 GLUCOSE BLOOD TEST: CPT

## 2025-01-04 PROCEDURE — 85027 COMPLETE CBC AUTOMATED: CPT | Performed by: STUDENT IN AN ORGANIZED HEALTH CARE EDUCATION/TRAINING PROGRAM

## 2025-01-04 PROCEDURE — 83735 ASSAY OF MAGNESIUM: CPT | Performed by: STUDENT IN AN ORGANIZED HEALTH CARE EDUCATION/TRAINING PROGRAM

## 2025-01-04 PROCEDURE — 99239 HOSP IP/OBS DSCHRG MGMT >30: CPT | Performed by: INTERNAL MEDICINE

## 2025-01-04 PROCEDURE — 82947 ASSAY GLUCOSE BLOOD QUANT: CPT | Performed by: STUDENT IN AN ORGANIZED HEALTH CARE EDUCATION/TRAINING PROGRAM

## 2025-01-04 RX ORDER — NALOXONE HYDROCHLORIDE 0.4 MG/ML
0.2 INJECTION, SOLUTION INTRAMUSCULAR; INTRAVENOUS; SUBCUTANEOUS
Status: DISCONTINUED | OUTPATIENT
Start: 2025-01-04 | End: 2025-01-04 | Stop reason: HOSPADM

## 2025-01-04 RX ORDER — AMOXICILLIN 250 MG
2 CAPSULE ORAL 2 TIMES DAILY
Qty: 120 TABLET | Refills: 3 | Status: SHIPPED | OUTPATIENT
Start: 2025-01-04 | End: 2025-01-04

## 2025-01-04 RX ORDER — AMOXICILLIN 250 MG
2 CAPSULE ORAL 2 TIMES DAILY
Qty: 120 TABLET | Refills: 3 | Status: SHIPPED | OUTPATIENT
Start: 2025-01-04

## 2025-01-04 RX ORDER — NALOXONE HYDROCHLORIDE 0.4 MG/ML
0.4 INJECTION, SOLUTION INTRAMUSCULAR; INTRAVENOUS; SUBCUTANEOUS
Status: DISCONTINUED | OUTPATIENT
Start: 2025-01-04 | End: 2025-01-04 | Stop reason: HOSPADM

## 2025-01-04 RX ORDER — POLYETHYLENE GLYCOL 3350 17 G/17G
17 POWDER, FOR SOLUTION ORAL DAILY
Status: DISCONTINUED | OUTPATIENT
Start: 2025-01-04 | End: 2025-01-04 | Stop reason: HOSPADM

## 2025-01-04 RX ORDER — POLYETHYLENE GLYCOL 3350 17 G/17G
17 POWDER, FOR SOLUTION ORAL 2 TIMES DAILY
Qty: 510 G | Refills: 2 | Status: SHIPPED | OUTPATIENT
Start: 2025-01-04

## 2025-01-04 RX ADMIN — POLYETHYLENE GLYCOL 3350 17 G: 17 POWDER, FOR SOLUTION ORAL at 08:00

## 2025-01-04 RX ADMIN — LISINOPRIL 40 MG: 20 TABLET ORAL at 08:00

## 2025-01-04 ASSESSMENT — ACTIVITIES OF DAILY LIVING (ADL)
ADLS_ACUITY_SCORE: 61
ADLS_ACUITY_SCORE: 57
ADLS_ACUITY_SCORE: 61
ADLS_ACUITY_SCORE: 61
ADLS_ACUITY_SCORE: 57
ADLS_ACUITY_SCORE: 61
ADLS_ACUITY_SCORE: 57
ADLS_ACUITY_SCORE: 57
ADLS_ACUITY_SCORE: 61
ADLS_ACUITY_SCORE: 57
ADLS_ACUITY_SCORE: 61
ADLS_ACUITY_SCORE: 61

## 2025-01-04 NOTE — PLAN OF CARE
Two large stools on night shift, last stool was very loose. Did agree to take Murelax as scheduled. States she feels better, denies pain. Wants to eat breakfast. Daughter at bedside - supportive. Hopes to go home today. Falls and pressure ulcer prevention plans in place. Elyse Gee RN      Problem: Constipation  Goal: Effective Bowel Elimination  Outcome: Progressing     Problem: Comorbidity Management  Goal: Blood Pressure in Desired Range  Intervention: Maintain Blood Pressure Management  Recent Flowsheet Documentation  Taken 1/4/2025 0800 by Elyse Gee RN  Medication Review/Management: medications reviewed

## 2025-01-04 NOTE — CONSULTS
General Surgery Consultation  Alisson Gooden MRN# [id number]   Age/Sex: 84 year old female YOB: 1940     Reason for consult: 1. Slow transit constipation    2. Constipation, unspecified constipation type    3. Ileus (H)            Requesting physician: Emergency Department                   Assessment and Plan:   Assessment:  Constipation versus partial SBO  Ms. Gooden is an 85 yo F who was admitted with constipation x 5 days.  Afebrile and hemodynamically stable.  Workup included a CT scan which demonstrated multiple dilated loops of small bowel with apparent mesenteric defect in the mid abdomen with several loops of small bowel coursing through this defect concerning for possible partial bowel obstruction as well as a large amount of stool throughout the colon.  Patient clinically stable and had 2 large bowel movements following an enema.  She has been passing flatus throughout the week.  Her abdominal exam is benign.  Patient's history more consistent with constipation rather than bowel obstruction and given that she has been passing flatus this argues against complete bowel obstruction.  Recommend diet advancement and continued bowel regimen.    Plan:  -Okay for regular diet as tolerated  -Continue bowel regimen  -No indication for surgical intervention  -Surgery will sign off at this time.  Please call if further questions.          Chief Complaint:     Chief Complaint   Patient presents with    Constipation        History is obtained from the patient and electronic health record    HPI:   Alisson Gooden is a 84 year old female with PMH of HTN who presented to the [facility] emergency department with constipation x 5 days.  Patient reports she has been having more trouble with constipation over the last few weeks.  She states that her last bowel movement was 2 days ago and was quite firm and small.  She reports that she was told she should try MiraLAX but did not take this.  Denies abdominal pain,  nausea, vomiting, fever, chills, diarrhea, hematochezia, melena.  Endorses passing flatus throughout the past week.  She has been able to eat and drink without issues.  She reports that her  passed away a few months ago and since then she has somewhat declined on her own personal medical management.  She states she used to be very good about fluid intake as well as taking daily fiber and she has not been doing this.  She has also been traveling quite a bit recently with her family and has not been in her usual environment.  States that overnight she had 2 large bowel movements after an enema.  She is feeling better this morning.  Past abdominal surgical history includes a cholecystectomy, hysterectomy, appendectomy.          Past Medical History:   History reviewed. No pertinent past medical history.           Past Surgical History:     Past Surgical History:   Procedure Laterality Date    APPENDECTOMY      CHOLECYSTECTOMY      ECTOPIC PREGNANCY SURGERY      EP LOOP RECORDER IMPLANT N/A 1/19/2024    Procedure: Loop Recorder Implant;  Surgeon: Ranjan Jameson MD;  Location: [facility] LAB     Z PARTIAL HIP REPLACEMENT Left 5/15/2021    Procedure: LEFT HEMIARTHROPLASTY, HIP, BIPOLAR;  Surgeon: Jermaine Greenfield MD;  Location: [facility];  Service: Orthopedics             Social History:    reports that she has never smoked. She has never used smokeless tobacco. She reports that she does not currently use alcohol.           Family History:     Family History   Problem Relation Age of Onset    Coronary Artery Disease Mother     Gallbladder Disease Father     Coronary Artery Disease Brother               Allergies:     Allergies   Allergen Reactions    Acetaminophen      Other Reaction(s): *Unknown    Codeine      Other Reaction(s): Unknown    Oxycodone Nausea    Pravastatin      Other Reaction(s): muscle pain    Simvastatin      Other Reaction(s): achiness    Sulfa Antibiotics                Medications:     Prior to Admission medications    Medication Sig Start Date End Date Taking? Authorizing Provider   cholecalciferol, vitamin D3, 1,000 unit (25 mcg) tablet Take 1,000 Units by mouth three times a week. 5/14/21  Yes Provider, Historical   ibuprofen (ADVIL,MOTRIN) 200 MG tablet [IBUPROFEN (ADVIL,MOTRIN) 200 MG TABLET] Take 400 mg by mouth every 8 (eight) hours as needed for pain.  5/14/21  Yes Provider, Historical   lisinopril (ZESTRIL) 40 MG tablet Take 40 mg by mouth every morning. 8/26/23  Yes Reported, Patient   omeprazole (PRILOSEC) 20 MG DR capsule Take 20 mg by mouth daily as needed (acid reflux)   Yes Reported, Patient   polyethylene glycol (MIRALAX) 17 GM/Dose powder Take 17 g by mouth 2 times daily. As needed.  Use if no BM for 2 days. 1/4/25  Yes Bandar Lovelace MD   senna-docusate (SENOKOT-S/PERICOLACE) 8.6-50 MG tablet Take 2 tablets by mouth 2 times daily. Hold if loose stools. 1/4/25  Yes Bandar Lovelace MD              Review of Systems:   The Review of Systems is negative other than noted in the HPI            Physical Exam:   Patient Vitals for the past 24 hrs:   BP Temp Temp src Pulse Resp SpO2 Weight   01/04/25 1103 (!) 145/63 98.1  F (36.7  C) Oral 78 16 96 % --   01/04/25 0757 (!) 161/80 98  F (36.7  C) Oral 88 -- 97 % --   01/04/25 0420 (!) 165/73 98.5  F (36.9  C) Oral 72 18 99 % --   01/04/25 0027 (!) 173/77 -- -- 70 -- 99 % --   01/03/25 2300 (!) 179/82 97.8  F (36.6  C) Oral 70 18 99 % --   01/03/25 2210 (!) 173/80 -- -- -- -- -- --   01/03/25 2155 -- 98.1  F (36.7  C) -- -- -- -- --   01/03/25 2045 (!) 181/89 98.1  F (36.7  C) -- 91 20 98 % --   01/03/25 1639 (!) 156/90 98.2  F (36.8  C) -- 84 18 96 % 64.9 kg (143 lb)          Intake/Output Summary (Last 24 hours) at 1/4/2025 1405  Last data filed at 1/4/2025 1105  Gross per 24 hour   Intake 1780 ml   Output --   Net 1780 ml      Constitutional:   awake, alert, cooperative, no apparent distress, and  appears stated age       Eyes:   PERRL, conjunctiva/corneas clear, EOM's intact; no scleral edema or icterus noted        ENT:   Normocephalic, without obvious abnormality, atraumatic, Lips, mucosa, and tongue normal        Lungs:   Normal respiratory effort, no accessory muscle use       Cardiovascular:   Regular rate and rhythm       Abdomen:   Soft, nontender to palpation, mildly distended.  No rebound or guarding.       Musculoskeletal:   No obvious swelling, bruising or deformity       Skin:   Skin color and texture normal for patient, no rashes or lesions              Data:         All imaging studies reviewed by me.    Results for orders placed or performed during the hospital encounter of 01/03/25 (from the past 24 hours)   CBC with platelets differential    Narrative    The following orders were created for panel order CBC with platelets differential.  Procedure                               Abnormality         Status                     ---------                               -----------         ------                     CBC with platelets and d...[[id number]]                      Final result                 Please view results for these tests on the individual orders.   Basic metabolic panel   Result Value Ref Range    Sodium 132 (L) 135 - 145 mmol/L    Potassium 4.9 3.4 - 5.3 mmol/L    Chloride 98 98 - 107 mmol/L    Carbon Dioxide (CO2) 25 22 - 29 mmol/L    Anion Gap 9 7 - 15 mmol/L    Urea Nitrogen 11.0 8.0 - 23.0 mg/dL    Creatinine 0.78 0.51 - 0.95 mg/dL    GFR Estimate 74 >60 mL/min/1.73m2    Calcium 9.3 8.8 - 10.4 mg/dL    Glucose 100 (H) 70 - 99 mg/dL   Hepatic function panel   Result Value Ref Range    Protein Total 6.1 (L) 6.4 - 8.3 g/dL    Albumin 4.1 3.5 - 5.2 g/dL    Bilirubin Total 0.7 <=1.2 mg/dL    Alkaline Phosphatase 69 40 - 150 U/L    AST 18 0 - 45 U/L    ALT 12 0 - 50 U/L    Bilirubin Direct <0.20 0.00 - 0.30 mg/dL   Lipase   Result Value Ref Range    Lipase 37 13 - 60 U/L   Lactic  acid whole blood with 1x repeat in 2 hr when >2   Result Value Ref Range    Lactic Acid, Initial 0.6 (L) 0.7 - 2.0 mmol/L   CBC with platelets and differential   Result Value Ref Range    WBC Count 6.8 4.0 - 11.0 10e3/uL    RBC Count 4.77 3.80 - 5.20 10e6/uL    Hemoglobin 14.4 11.7 - 15.7 g/dL    Hematocrit 42.5 35.0 - 47.0 %    MCV 89 78 - 100 fL    MCH 30.2 26.5 - 33.0 pg    MCHC 33.9 31.5 - 36.5 g/dL    RDW 13.6 10.0 - 15.0 %    Platelet Count 324 150 - 450 10e3/uL    % Neutrophils 61 %    % Lymphocytes 31 %    % Monocytes 8 %    % Eosinophils 1 %    % Basophils 0 %    % Immature Granulocytes 0 %    NRBCs per 100 WBC 0 <1 /100    Absolute Neutrophils 4.1 1.6 - 8.3 10e3/uL    Absolute Lymphocytes 2.1 0.8 - 5.3 10e3/uL    Absolute Monocytes 0.5 0.0 - 1.3 10e3/uL    Absolute Eosinophils 0.1 0.0 - 0.7 10e3/uL    Absolute Basophils 0.0 0.0 - 0.2 10e3/uL    Absolute Immature Granulocytes 0.0 <=0.4 10e3/uL    Absolute NRBCs 0.0 10e3/uL   Magnesium   Result Value Ref Range    Magnesium 2.0 1.7 - 2.3 mg/dL   Phosphorus   Result Value Ref Range    Phosphorus 3.7 2.5 - 4.5 mg/dL   Hemoglobin A1c   Result Value Ref Range    Estimated Average Glucose 97 <117 mg/dL    Hemoglobin A1C 5.0 <5.7 %   CT Abdomen Pelvis w Contrast    Narrative    EXAM: CT ABDOMEN PELVIS W CONTRAST  LOCATION: [facility]  DATE: 1/3/2025    INDICATION: Lower abdominal pain.  COMPARISON: None.  TECHNIQUE: CT scan of the abdomen and pelvis was performed following injection of IV contrast. Multiplanar reformats were obtained. Dose reduction techniques were used.  CONTRAST: 70 mL ISOVUE-370.    FINDINGS:   LOWER CHEST: Elevation of the left hemidiaphragm with stomach, colon and small bowel beneath the hemidiaphragm    HEPATOBILIARY: Normal.    PANCREAS: Normal.    SPLEEN: Normal.    ADRENAL GLANDS: Normal.    KIDNEYS/BLADDER: Some scarring and atrophy left kidney. Simple cyst left kidney which does not warrant follow-up.    BOWEL:  Constipation with large amount of stool present throughout the colon. There are dilated loops of small bowel upper abdomen with a presumed mesenteric defect in the mid abdomen with narrowing of small bowel loops at the apparent mesenteric defect.   This is at least likely causing partial small bowel obstruction.    LYMPH NODES: Normal.    VASCULATURE: Normal.    PELVIC ORGANS: Hysterectomy.    MUSCULOSKELETAL: Left hip arthroplasty.      Impression    IMPRESSION:   1.  Significant elevation left hemidiaphragm. Left-to-right mediastinal shift.    2.  There are multiple dilated loops of small bowel present. Apparent mesenteric defect in the mid abdomen with several small bowel loops coursing through this defect demonstrating narrowing. There is at least probable partial small bowel obstruction due   to the defect. Large amount of stool throughout the colon including the distal colon and rectum.    3.  Previous cholecystectomy and hysterectomy.   Glucose by meter   Result Value Ref Range    GLUCOSE BY METER POCT 94 70 - 99 mg/dL   Comprehensive metabolic panel   Result Value Ref Range    Sodium 135 135 - 145 mmol/L    Potassium 4.0 3.4 - 5.3 mmol/L    Carbon Dioxide (CO2) 25 22 - 29 mmol/L    Anion Gap 9 7 - 15 mmol/L    Urea Nitrogen 8.6 8.0 - 23.0 mg/dL    Creatinine 0.71 0.51 - 0.95 mg/dL    GFR Estimate 82 >60 mL/min/1.73m2    Calcium 8.9 8.8 - 10.4 mg/dL    Chloride 101 98 - 107 mmol/L    Glucose 86 70 - 99 mg/dL    Alkaline Phosphatase 63 40 - 150 U/L    AST 19 0 - 45 U/L    ALT 11 0 - 50 U/L    Protein Total 5.6 (L) 6.4 - 8.3 g/dL    Albumin 3.8 3.5 - 5.2 g/dL    Bilirubin Total 0.8 <=1.2 mg/dL   CBC with platelets   Result Value Ref Range    WBC Count 5.0 4.0 - 11.0 10e3/uL    RBC Count 4.60 3.80 - 5.20 10e6/uL    Hemoglobin 14.1 11.7 - 15.7 g/dL    Hematocrit 41.0 35.0 - 47.0 %    MCV 89 78 - 100 fL    MCH 30.7 26.5 - 33.0 pg    MCHC 34.4 31.5 - 36.5 g/dL    RDW 13.7 10.0 - 15.0 %    Platelet Count 267 150 -  450 10e3/uL   Phosphorus   Result Value Ref Range    Phosphorus 3.7 2.5 - 4.5 mg/dL   Magnesium   Result Value Ref Range    Magnesium 1.9 1.7 - 2.3 mg/dL   Glucose by meter   Result Value Ref Range    GLUCOSE BY METER POCT 133 (H) 70 - 99 mg/dL           Mandy Corona PA-C  [facility]  Surgery [address] [address]?  Office: [phone]

## 2025-01-04 NOTE — ED NOTES
Bed: [place] [address]  Expected date:   Expected time:   Means of arrival:   Comments:  Triage patient L.B. when admitted

## 2025-01-04 NOTE — MEDICATION SCRIBE - ADMISSION MEDICATION HISTORY
Medication Scribe Admission Medication History    Admission medication history is complete. The information provided in this note is only as accurate as the sources available at the time of the update.    Information Source(s): Patient via in-person    Pertinent Information: patient reports self management of medications.     Changes made to PTA medication list:  Added: None  Deleted: None  Changed: D3 to three times weekly. (Per patient)    Allergies reviewed with patient and updates made in EHR: yes    Medication History Completed By: David Valero 1/3/2025 9:11 PM    PTA Med List   Medication Sig Last Dose/Taking    cholecalciferol, vitamin D3, 1,000 unit (25 mcg) tablet Take 1,000 Units by mouth three times a week. 1/3/2025    ibuprofen (ADVIL,MOTRIN) 200 MG tablet [IBUPROFEN (ADVIL,MOTRIN) 200 MG TABLET] Take 400 mg by mouth every 8 (eight) hours as needed for pain.  Taking As Needed    lisinopril (ZESTRIL) 40 MG tablet Take 40 mg by mouth every morning. 1/3/2025 Morning    omeprazole (PRILOSEC) 20 MG DR capsule Take 20 mg by mouth daily as needed (acid reflux) Taking As Needed

## 2025-01-04 NOTE — PLAN OF CARE
Problem: Adult Inpatient Plan of Care  Goal: Plan of Care Review  Description: The Plan of Care Review/Shift note should be completed every shift.  The Outcome Evaluation is a brief statement about your assessment that the patient is improving, declining, or no change.  This information will be displayed automatically on your shift  note.  Outcome: Progressing     Problem: Pain Acute  Goal: Optimal Pain Control and Function  Outcome: Progressing     Problem: Constipation  Goal: Effective Bowel Elimination  Outcome: Progressing   Goal Outcome Evaluation:         Pt continues to deny pain/nausea. Soapsuds enema given. No BM yet. BP elevated, not within parameters for PRN hydralazine. Other VSS. BG was 94. Ambulates with SBA and cane. Daughter at bedside.    Usama Pineda RN

## 2025-01-04 NOTE — PLAN OF CARE
"2 large BM after enema OVN. Pt states feels less compacted, but \"still feels backed up\".     AOx4; VSS on RA; denies pain, CP, SOB, dizziness.  Able to make needs known, call light in reach.    Mckenna Hyatt RN    "

## 2025-01-04 NOTE — H&P
[facility]    History and Physical - Hospitalist Service       Date of Admission:  1/3/2025    Assessment & Plan    Assessment:  Alisson Gooden is a 84 year old female with a past medical history documented below presented to the hospital with abdominal discomfort associated with constipation and possible bowel obstruction, CT scan of the abdomen was performed which showed Significant elevation left hemidiaphragm. Left-to-right mediastinal shift. There are multiple dilated loops of small bowel present. Apparent mesenteric defect in the mid abdomen with several small bowel loops coursing through this defect demonstrating narrowing. There is at least probable partial small bowel obstruction due to the defect. Large amount of stool throughout the colon including the distal colon and rectum. Previous cholecystectomy and hysterectomy.  Patient is going to be admitted for partial small bowel obstruction.    Problem list and plan:  Partial small bowel obstruction versus severe constipation  Patient presented to the hospital with abdominal discomfort associated with constipation and possible bowel obstruction  Patient has not had significant bowel movement in the past 5 days  as been at the primary care clinic 2 times this week and has been placed on bowel regimen without significant bowel movement  Previously had abdominal flat and upright imaging which were concerning for ileus versus partial small bowel obstruction  Had another x-ray which was unchanged and patient was subsequently referred to the ED for further evaluation  CT scan of the abdomen was performed which showed  There are multiple dilated loops of small bowel present. Apparent mesenteric defect in the mid abdomen with several small bowel loops coursing through this defect demonstrating narrowing. There is at least probable partial small bowel obstruction due to the defect. Large amount of stool throughout the colon including the distal  colon and rectum.   Placed on gentle IV hydration  Surgery consulted  Patient does not have any nausea vomiting so NG tube was not placed at current time  Will keep n.p.o., bowel rest, serial abdominal examination  Continue with pain management as per protocol and antiemetics as appropriate    Mild hyponatremia secondary to hypovolemia  Monitor sodium levels with gentle IV hydration    Mild hyperglycemia most likely reactive  Follow-up hemoglobin A1c  Monitor blood sugar level intermittently    History of hypertension with high blood pressure  Monitor vital signs as per protocol  IV hydralazine as needed for elevated blood pressure  Continue with lisinopril    DVT PPx  Intermittent pneumatic compression    CODE STATUS  Full code as per discussion with the patient        Diet: NPO for Medical/Clinical Reasons Except for: Meds    DVT Prophylaxis: Pneumatic Compression Devices  Berry Catheter: Not present  Lines: None     Cardiac Monitoring: None  Code Status: Full Code  Mental status: Patient is alert awake and oriented x 3, patient is a good Historian and most of the history was obtained from the patient, some history was obtained from chart review and the rest of the history was obtained from discussion with the ER physician  Clinically Significant Risk Factors Present on Admission         # Hyponatremia: Lowest Na = 132 mmol/L in last 2 days, will monitor as appropriate           # Hypertension: Noted on problem list                    Disposition Plan     Medically Ready for Discharge: Anticipated in 2-4 Days     Saad J. Gondal, MD  Hospitalist Service  [facility]  Securely message with [employer] (more info)  Text page via [email or web address] Paging/Directory   ______________________________________________________________________    Chief Complaint   Abdominal discomfort and constipation    History is obtained from the patient and chart review    History of Present Illness   Alisson Gooden is a 84 year old  female with a past medical history documented below presented to the hospital with abdominal discomfort associated with constipation and possible bowel obstruction, patient has not had significant bowel movement in the past 5 days, has been at the primary care clinic 2 times this week and has been placed on bowel regimen without significant bowel movement, previously had abdominal flat and upright imaging which were concerning for ileus versus partial small bowel obstruction, had another x-ray which was unchanged and patient was subsequently referred to the ED for further evaluation, in the ER vitals were significant for elevated blood pressure, labs significant for mild hyponatremia, mild hyperglycemia, CT scan of the abdomen was performed which showed Significant elevation left hemidiaphragm. Left-to-right mediastinal shift. There are multiple dilated loops of small bowel present. Apparent mesenteric defect in the mid abdomen with several small bowel loops coursing through this defect demonstrating narrowing. There is at least probable partial small bowel obstruction due to the defect. Large amount of stool throughout the colon including the distal colon and rectum. Previous cholecystectomy and hysterectomy.  Patient is going to be admitted for partial small bowel obstruction.      Past Medical History    History reviewed. No pertinent past medical history.    Past Surgical History   Past Surgical History:   Procedure Laterality Date    APPENDECTOMY      CHOLECYSTECTOMY      ECTOPIC PREGNANCY SURGERY      EP LOOP RECORDER IMPLANT N/A 1/19/2024    Procedure: Loop Recorder Implant;  Surgeon: Ranjan Jameson MD;  Location: [facility] PARTIAL HIP REPLACEMENT Left 5/15/2021    Procedure: LEFT HEMIARTHROPLASTY, HIP, BIPOLAR;  Surgeon: Jermaine Greenfield MD;  Location: [facility];  Service: Orthopedics       Prior to Admission Medications   Prior to Admission Medications   Prescriptions Last  Dose Informant Patient Reported? Taking?   cholecalciferol, vitamin D3, 1,000 unit (25 mcg) tablet 1/3/2025 Self Yes Yes   Sig: Take 1,000 Units by mouth three times a week.   ibuprofen (ADVIL,MOTRIN) 200 MG tablet  Self Yes Yes   Sig: [IBUPROFEN (ADVIL,MOTRIN) 200 MG TABLET] Take 400 mg by mouth every 8 (eight) hours as needed for pain.    lisinopril (ZESTRIL) 40 MG tablet 1/3/2025 Morning Self Yes Yes   Sig: Take 40 mg by mouth every morning.   omeprazole (PRILOSEC) 20 MG DR capsule  Self Yes Yes   Sig: Take 20 mg by mouth daily as needed (acid reflux)      Facility-Administered Medications: None        Review of Systems    The 10 point Review of Systems is negative other than noted in the HPI or here.  Abdominal discomfort and constipation    Physical Exam   Vital Signs: Temp: 97.8  F (36.6  C) Temp src: Oral BP: (!) 173/77 Pulse: 70   Resp: 18 SpO2: 99 % O2 Device: None (Room air)    Weight: 143 lbs 0 oz    GENERAL: Patient was seen and examined at bedside with no acute distress  HENT:  Head is normocephalic, atraumatic.   EYES:  Eyes have anicteric sclerae without conjunctival injection   LUNGS: Bilateral air entry, clear to auscultation bilaterally  CARDIOVASCULAR:  Regular rate and rhythm with no murmurs, gallops or rubs.  ABDOMEN:  Normal bowel sounds. Soft; nontender   EXT: no edema    SKIN:  No acute rashes.   NEUROLOGIC:  Grossly nonfocal.      Medical Decision Making       80 MINUTES SPENT BY ME on the date of service doing chart review, history, exam, documentation & further activities per the note.      Data     I have personally reviewed the following data over the past 24 hrs:    6.8  \   14.4   / 324     132 (L) 98 11.0 /  94   4.9 25 0.78 \     ALT: 12 AST: 18 AP: 69 TBILI: 0.7   ALB: 4.1 TOT PROTEIN: 6.1 (L) LIPASE: 37     TSH: N/A T4: N/A A1C: 5.0     Procal: N/A CRP: N/A Lactic Acid: 0.6 (L)         Imaging results reviewed over the past 24 hrs:   Recent Results (from the past 24 hours)   CT  Abdomen Pelvis w Contrast    Narrative    EXAM: CT ABDOMEN PELVIS W CONTRAST  LOCATION: [facility]  DATE: 1/3/2025    INDICATION: Lower abdominal pain.  COMPARISON: None.  TECHNIQUE: CT scan of the abdomen and pelvis was performed following injection of IV contrast. Multiplanar reformats were obtained. Dose reduction techniques were used.  CONTRAST: 70 mL [id number].    FINDINGS:   LOWER CHEST: Elevation of the left hemidiaphragm with stomach, colon and small bowel beneath the hemidiaphragm    HEPATOBILIARY: Normal.    PANCREAS: Normal.    SPLEEN: Normal.    ADRENAL GLANDS: Normal.    KIDNEYS/BLADDER: Some scarring and atrophy left kidney. Simple cyst left kidney which does not warrant follow-up.    BOWEL: Constipation with large amount of stool present throughout the colon. There are dilated loops of small bowel upper abdomen with a presumed mesenteric defect in the mid abdomen with narrowing of small bowel loops at the apparent mesenteric defect.   This is at least likely causing partial small bowel obstruction.    LYMPH NODES: Normal.    VASCULATURE: Normal.    PELVIC ORGANS: Hysterectomy.    MUSCULOSKELETAL: Left hip arthroplasty.      Impression    IMPRESSION:   1.  Significant elevation left hemidiaphragm. Left-to-right mediastinal shift.    2.  There are multiple dilated loops of small bowel present. Apparent mesenteric defect in the mid abdomen with several small bowel loops coursing through this defect demonstrating narrowing. There is at least probable partial small bowel obstruction due   to the defect. Large amount of stool throughout the colon including the distal colon and rectum.    3.  Previous cholecystectomy and hysterectomy.

## 2025-01-04 NOTE — DISCHARGE SUMMARY
[facility]    Discharge Summary  Hospitalist    Date of Admission:  1/3/2025  Date of Discharge:  1/4/2025  Discharging Provider: Bandar Mcclain MD, MD    Discharge Diagnoses   Principal Problem:    Partial small bowel obstruction (H)  Active Problems:    Benign essential hypertension    Constipation, severe, low motility    Abdominal pain      History of Present Illness   84 year old female with abdominal discomfort associated with constipation and possible bowel obstruction, patient has not had significant bowel movement in the past 5 days, has been at the primary care clinic 2 times this week and has been placed on bowel regimen without significant bowel movement, previously had abdominal flat and upright imaging which were concerning for partial small bowel obstruction, had another x-ray which was unchanged and patient was subsequently referred to the ED for further evaluation, in the ER vitals were significant for elevated blood pressure, labs significant for Sodium 132, mild hyperglycemia, CT scan of the abdomen was performed which showed Significant elevation left hemidiaphragm. Left-to-right mediastinal shift. There are multiple dilated loops of small bowel present.  Several small bowel loops and Large amount of stool throughout the colon including the distal colon and rectum. Previous cholecystectomy and hysterectomy.        Hospital Course   In ER was given and enema with 3 large BM's, and then oral Miralax with 2 more loose stools and felt much better, abd pain resolved and requested to discharge home with daughters supervision.     Bandar Mcclain MD  Pager: [phone]  Cell Phone:  [phone]     (35 min total)      Significant Results and Procedures   As above    Pending Results   These results will be followed up by Dr. Mcclain  Unresulted Labs Ordered in the Past 30 Days of this Admission       No orders found for last 31 day(s).            Code Status    Full Code       Primary Care Physician   Vani Berkowitz    Physical Exam   Temp: 98.1  F (36.7  C) Temp src: Oral BP: (!) 145/63 Pulse: 78   Resp: 16 SpO2: 96 % O2 Device: None (Room air)    Vitals:    01/03/25 1639   Weight: 64.9 kg (143 lb)     Vital Signs with Ranges  Temp:  [97.8  F (36.6  C)-98.5  F (36.9  C)] 98.1  F (36.7  C)  Pulse:  [70-91] 78  Resp:  [16-20] 16  BP: (145-181)/(63-90) 145/63  SpO2:  [96 %-99 %] 96 %  I/O last 3 completed shifts:  In: 1780 [P.O.:780; I.V.:1000]  Out: -     Exam on discharge:   Abd non-tender with normal bowel sounds.     Discharge Disposition   Discharged to home  Condition at discharge: Stable    Consultations This Hospital Stay   SURGERY GENERAL IP CONSULT    Time Spent on this Encounter   I spent a total of 35 minutes discharging this patient.     Discharge Orders      Reason for your hospital stay    Abdominal pain and partial small bowel obstruction related to severe constipation.     Activity    Your activity upon discharge: activity as tolerated     Discharge Instructions    Call Dr. Lovelace if any medical questions at Cell Phone [phone].     Diet    Follow this diet upon discharge: Current Diet:Orders Placed This Encounter      Regular Diet Adult     Hospital Follow-up with Existing Primary Care Provider (PCP)    Please see details below          Discharge Medications   Current Discharge Medication List        START taking these medications    Details   polyethylene glycol (MIRALAX) 17 GM/Dose powder Take 17 g by mouth 2 times daily. As needed.  Use if no BM for 2 days.  Qty: 510 g, Refills: 2    Associated Diagnoses: Slow transit constipation      senna-docusate (SENOKOT-S/PERICOLACE) 8.6-50 MG tablet Take 2 tablets by mouth 2 times daily. Hold if loose stools.  Qty: 120 tablet, Refills: 3    Associated Diagnoses: Slow transit constipation           CONTINUE these medications which have NOT CHANGED    Details   cholecalciferol, vitamin D3, 1,000 unit (25  mcg) tablet Take 1,000 Units by mouth three times a week.      ibuprofen (ADVIL,MOTRIN) 200 MG tablet [IBUPROFEN (ADVIL,MOTRIN) 200 MG TABLET] Take 400 mg by mouth every 8 (eight) hours as needed for pain.       lisinopril (ZESTRIL) 40 MG tablet Take 40 mg by mouth every morning.      omeprazole (PRILOSEC) 20 MG DR capsule Take 20 mg by mouth daily as needed (acid reflux)           Allergies   Allergies   Allergen Reactions    Acetaminophen      Other Reaction(s): *Unknown    Codeine      Other Reaction(s): Unknown    Oxycodone Nausea    Pravastatin      Other Reaction(s): muscle pain    Simvastatin      Other Reaction(s): achiness    Sulfa Antibiotics      Data   Most Recent 3 CBC's:  Recent Labs   Lab Test 01/04/25  0827 01/03/25  1749 10/09/23  0538   WBC 5.0 6.8 6.3   HGB 14.1 14.4 12.4   MCV 89 89 89    324 283      Most Recent 3 BMP's:  Recent Labs   Lab Test 01/04/25  1158 01/04/25 0827 01/03/25  2233 01/03/25  1749 10/07/24  1626   NA  --  135  --  132* 133*   POTASSIUM  --  4.0  --  4.9 4.6   CHLORIDE  --  101  --  98 102   CO2  --  25  --  25 22   BUN  --  8.6  --  11.0 13.4   CR  --  0.71  --  0.78 0.75   ANIONGAP  --  9  --  9 9   NALDO  --  8.9  --  9.3 9.3   * 86 94 100* 91     Most Recent 2 LFT's:  Recent Labs   Lab Test 01/04/25 0827 01/03/25  1749   AST 19 18   ALT 11 12   ALKPHOS 63 69   BILITOTAL 0.8 0.7     Most Recent INR's and Anticoagulation Dosing History:  Anticoagulation Dose History          Latest Ref Rng & Units 5/14/2021 5/15/2021 5/16/2021 5/17/2021 5/18/2021   Recent Dosing and Labs   INR 0.90 - 1.10 1.03  1.07  1.14  1.19  1.17      Most Recent 3 Troponin's:No lab results found.  Most Recent Cholesterol Panel:  Recent Labs   Lab Test 10/07/24  1626   CHOL 207*   *   HDL 67   TRIG 76     Most Recent 6 Bacteria Isolates From Any Culture (See EPIC Reports for Culture Details):No lab results found.  Most Recent TSH, T4 and A1c Labs:  Recent Labs   Lab Test  01/03/25  1749 11/10/23  1610   TSH  --  0.90   A1C 5.0  --        You can access the FollowMyHealth Patient Portal offered by [facility] by registering at the following website: [email or web address]. By joining [employer]’s FollowMyHealth portal, you will also be able to view your health information using other applications (apps) compatible with our system.

## 2025-01-04 NOTE — PLAN OF CARE
Discharging to home with daughter [place]. [place] will help her get her Mirelax and senna. Follow up with primary in the next 7 days will be scheduled by [place].  States they have all of the belongings including ring, watch, clothing. Elyse Gee RN

## 2025-02-23 ENCOUNTER — ANCILLARY PROCEDURE (OUTPATIENT)
Dept: CARDIOLOGY | Facility: CLINIC | Age: 85
End: 2025-02-23
Attending: INTERNAL MEDICINE
Payer: MEDICARE

## 2025-02-23 DIAGNOSIS — Z45.09 ENCOUNTER FOR LOOP RECORDER CHECK: ICD-10-CM

## 2025-02-23 DIAGNOSIS — R55 SYNCOPE: ICD-10-CM

## 2025-02-24 ENCOUNTER — TELEPHONE (OUTPATIENT)
Dept: CARDIOLOGY | Facility: CLINIC | Age: 85
End: 2025-02-24

## 2025-02-24 LAB
MDC_IDC_EPISODE_DTM: NORMAL
MDC_IDC_EPISODE_DURATION: 450 S
MDC_IDC_EPISODE_ID: NORMAL
MDC_IDC_EPISODE_TYPE: NORMAL
MDC_IDC_EPISODE_VENDOR_TYPE: NORMAL
MDC_IDC_MSMT_BATTERY_DTM: NORMAL
MDC_IDC_MSMT_BATTERY_STATUS: NORMAL
MDC_IDC_PG_IMPLANT_DTM: NORMAL
MDC_IDC_PG_MFG: NORMAL
MDC_IDC_PG_MODEL: NORMAL
MDC_IDC_PG_SERIAL: NORMAL
MDC_IDC_PG_TYPE: NORMAL
MDC_IDC_SESS_CLINIC_NAME: NORMAL
MDC_IDC_SESS_DTM: NORMAL
MDC_IDC_SESS_TYPE: NORMAL
MDC_IDC_STAT_AT_BURDEN_PERCENT: 0 %
MDC_IDC_STAT_AT_DTM_END: NORMAL
MDC_IDC_STAT_AT_DTM_START: NORMAL

## 2025-02-24 PROCEDURE — 93298 REM INTERROG DEV EVAL SCRMS: CPT | Performed by: INTERNAL MEDICINE

## 2025-02-24 NOTE — TELEPHONE ENCOUNTER
Type: alert remote loop recorder transmission for symptom triggered epsiode.  Device: [employer] LUX-Dx ICM.   Presenting rhythm: Sinus 60 bpm.   Battery status: OK  Arrhythmias: since 12/2/24; One symptom triggered episode on 2/22/25 15:53, appears to be normal sinus with frequent PVCs and PACs.  Plan: Routed to device Rn for review. NORMA Werner, Device Specialist  ADD: Transmission reviewed, see EPIC for details. Lena Glez RN

## 2025-02-27 NOTE — TELEPHONE ENCOUNTER
2/27/25: Left detailed voicemail message to review. Will close encounter as symptom not related to rate/rhythm and patient hasn't returned call. Lena Glez RN

## 2025-03-18 NOTE — UTILIZATION REVIEW
Medicare Post-Discharge Review      Admission Status; Secondary Review Determination       As part of the [facility] Utilization review plan, a self-audit is done on Medicare inpatient admission with less than 2 midnights stay. The 2014 IPPS Final Rule allows outpatient billing in the event that a hospital determines that an inpatient admission was not medically necessary under utilization review process.      (x) Outpatient status would be Appropriate- Short Stay- Post discharge review.     RATIONALE FOR DETERMINATION   Alisson Gooden is a 84 year old female with a past medical history documented below presented to the hospital with abdominal discomfort associated with constipation and possible bowel obstruction.  Overnight she had several BM, no NG needed and discharged the following morning.  Does not meet CMS criteria for inpatient with a one night stay.    Patient was admitted and discharge after one night stay. Record was sent by  for  Medicare short stay review by Medical Director. Based on the  severity of illness, intensity of service provided, expected LOS and risk for adverse outcome make the care appropriate for further outpatient/observation; however, doesn't meet criteria for hospital inpatient admission.           The information on this document is developed by the utilization review team in order for the business office to ensure compliance.  This only denotes the appropriateness of proper admission status and does not reflect the quality of care rendered.         The definitions of Inpatient Status and Observation Status used in making the determination above are those provided in the CMS Coverage Manual, Chapter 1 and Chapter 6, section 70.4.      Sincerely,     Dianne Serrato MD  Utilization Review  Medical Director  [facility].

## 2025-04-05 ENCOUNTER — HEALTH MAINTENANCE LETTER (OUTPATIENT)
Age: 85
End: 2025-04-05

## 2025-06-15 ENCOUNTER — ANCILLARY PROCEDURE (OUTPATIENT)
Dept: CARDIOLOGY | Facility: CLINIC | Age: 85
End: 2025-06-15
Attending: INTERNAL MEDICINE
Payer: COMMERCIAL

## 2025-06-15 DIAGNOSIS — Z45.09 ENCOUNTER FOR LOOP RECORDER CHECK: ICD-10-CM

## 2025-06-15 DIAGNOSIS — R55 SYNCOPE: ICD-10-CM

## 2025-06-26 ENCOUNTER — LAB REQUISITION (OUTPATIENT)
Dept: LAB | Facility: CLINIC | Age: 85
End: 2025-06-26
Payer: COMMERCIAL

## 2025-06-26 PROCEDURE — 87086 URINE CULTURE/COLONY COUNT: CPT | Mod: ORL

## 2025-06-28 LAB — BACTERIA UR CULT: NORMAL
